# Patient Record
Sex: FEMALE | Race: ASIAN | Employment: UNEMPLOYED | URBAN - METROPOLITAN AREA
[De-identification: names, ages, dates, MRNs, and addresses within clinical notes are randomized per-mention and may not be internally consistent; named-entity substitution may affect disease eponyms.]

---

## 2017-04-17 ENCOUNTER — COMMUNICATION - HEALTHEAST (OUTPATIENT)
Dept: FAMILY MEDICINE | Facility: CLINIC | Age: 22
End: 2017-04-17

## 2017-04-25 ENCOUNTER — PRENATAL OFFICE VISIT - HEALTHEAST (OUTPATIENT)
Dept: FAMILY MEDICINE | Facility: CLINIC | Age: 22
End: 2017-04-25

## 2017-04-25 DIAGNOSIS — N92.6 IRREGULAR MENSTRUAL CYCLE: ICD-10-CM

## 2017-04-25 DIAGNOSIS — Z32.01 POSITIVE PREGNANCY TEST: ICD-10-CM

## 2017-04-25 DIAGNOSIS — Z34.90 NORMAL PREGNANCY: ICD-10-CM

## 2017-04-25 LAB
HBV SURFACE AG SERPL QL IA: NEGATIVE
HIV 1+2 AB+HIV1 P24 AG SERPL QL IA: NEGATIVE

## 2017-04-25 ASSESSMENT — MIFFLIN-ST. JEOR: SCORE: 1369.47

## 2017-04-26 LAB — SYPHILIS RPR SCREEN - HISTORICAL: NORMAL

## 2017-05-03 ENCOUNTER — HOSPITAL ENCOUNTER (OUTPATIENT)
Dept: ULTRASOUND IMAGING | Facility: HOSPITAL | Age: 22
Discharge: HOME | End: 2017-05-03
Attending: PHYSICIAN ASSISTANT

## 2017-05-03 DIAGNOSIS — Z32.01 POSITIVE PREGNANCY TEST: ICD-10-CM

## 2017-05-03 DIAGNOSIS — Z34.90 NORMAL PREGNANCY: ICD-10-CM

## 2017-05-18 ENCOUNTER — PRENATAL OFFICE VISIT - HEALTHEAST (OUTPATIENT)
Dept: FAMILY MEDICINE | Facility: CLINIC | Age: 22
End: 2017-05-18

## 2017-05-18 DIAGNOSIS — Z34.90 SUPERVISION OF NORMAL PREGNANCY: ICD-10-CM

## 2017-05-18 ASSESSMENT — MIFFLIN-ST. JEOR: SCORE: 1351.51

## 2017-07-28 ENCOUNTER — COMMUNICATION - HEALTHEAST (OUTPATIENT)
Dept: TELEHEALTH | Facility: CLINIC | Age: 22
End: 2017-07-28

## 2017-07-28 ENCOUNTER — HOSPITAL ENCOUNTER (OUTPATIENT)
Dept: ULTRASOUND IMAGING | Facility: HOSPITAL | Age: 22
Discharge: HOME | End: 2017-07-28
Attending: FAMILY MEDICINE

## 2017-07-28 ENCOUNTER — PRENATAL OFFICE VISIT - HEALTHEAST (OUTPATIENT)
Dept: FAMILY MEDICINE | Facility: CLINIC | Age: 22
End: 2017-07-28

## 2017-07-28 DIAGNOSIS — Z34.90 SUPERVISION OF NORMAL PREGNANCY: ICD-10-CM

## 2017-07-28 DIAGNOSIS — Z33.1 PREGNANT STATE, INCIDENTAL: ICD-10-CM

## 2017-07-28 DIAGNOSIS — Z34.90 PREGNANCY: ICD-10-CM

## 2017-08-01 ENCOUNTER — COMMUNICATION - HEALTHEAST (OUTPATIENT)
Dept: FAMILY MEDICINE | Facility: CLINIC | Age: 22
End: 2017-08-01

## 2017-08-03 ENCOUNTER — RECORDS - HEALTHEAST (OUTPATIENT)
Dept: ADMINISTRATIVE | Facility: OTHER | Age: 22
End: 2017-08-03

## 2017-08-03 ENCOUNTER — OFFICE VISIT - HEALTHEAST (OUTPATIENT)
Dept: FAMILY MEDICINE | Facility: CLINIC | Age: 22
End: 2017-08-03

## 2017-08-03 DIAGNOSIS — Z34.90 SUPERVISION OF NORMAL PREGNANCY: ICD-10-CM

## 2017-08-03 DIAGNOSIS — O26.879 SHORT CERVICAL LENGTH DURING PREGNANCY: ICD-10-CM

## 2017-08-03 ASSESSMENT — MIFFLIN-ST. JEOR: SCORE: 1355.87

## 2017-11-05 ENCOUNTER — COMMUNICATION - HEALTHEAST (OUTPATIENT)
Dept: FAMILY MEDICINE | Facility: CLINIC | Age: 22
End: 2017-11-05

## 2017-11-09 ENCOUNTER — PRENATAL OFFICE VISIT - HEALTHEAST (OUTPATIENT)
Dept: FAMILY MEDICINE | Facility: CLINIC | Age: 22
End: 2017-11-09

## 2017-11-09 ENCOUNTER — HOSPITAL ENCOUNTER (OUTPATIENT)
Dept: LAB | Age: 22
Setting detail: SPECIMEN
Discharge: HOME | End: 2017-11-09

## 2017-11-09 DIAGNOSIS — O09.30 INSUFFICIENT PRENATAL CARE: ICD-10-CM

## 2017-11-09 DIAGNOSIS — Z34.93 ENCOUNTER FOR SUPERVISION OF NORMAL PREGNANCY IN THIRD TRIMESTER: ICD-10-CM

## 2017-11-09 DIAGNOSIS — Z23 NEED FOR IMMUNIZATION AGAINST INFLUENZA: ICD-10-CM

## 2017-11-09 DIAGNOSIS — O09.90 SUPERVISION OF HIGH-RISK PREGNANCY: ICD-10-CM

## 2017-11-10 ENCOUNTER — COMMUNICATION - HEALTHEAST (OUTPATIENT)
Dept: FAMILY MEDICINE | Facility: CLINIC | Age: 22
End: 2017-11-10

## 2017-11-10 LAB — SYPHILIS RPR SCREEN - HISTORICAL: NORMAL

## 2017-11-17 ENCOUNTER — PRENATAL OFFICE VISIT - HEALTHEAST (OUTPATIENT)
Dept: FAMILY MEDICINE | Facility: CLINIC | Age: 22
End: 2017-11-17

## 2017-11-17 DIAGNOSIS — D64.9 ANEMIA: ICD-10-CM

## 2017-11-17 DIAGNOSIS — Z34.93 ENCOUNTER FOR SUPERVISION OF NORMAL PREGNANCY IN THIRD TRIMESTER, UNSPECIFIED GRAVIDITY: ICD-10-CM

## 2017-11-21 ENCOUNTER — AMBULATORY - HEALTHEAST (OUTPATIENT)
Dept: NURSING | Facility: CLINIC | Age: 22
End: 2017-11-21

## 2017-11-27 ENCOUNTER — COMMUNICATION - HEALTHEAST (OUTPATIENT)
Dept: FAMILY MEDICINE | Facility: CLINIC | Age: 22
End: 2017-11-27

## 2017-11-29 ENCOUNTER — HOME CARE/HOSPICE - HEALTHEAST (OUTPATIENT)
Dept: HOME HEALTH SERVICES | Facility: HOME HEALTH | Age: 22
End: 2017-11-29

## 2017-12-01 ENCOUNTER — HOME CARE/HOSPICE - HEALTHEAST (OUTPATIENT)
Dept: HOME HEALTH SERVICES | Facility: HOME HEALTH | Age: 22
End: 2017-12-01

## 2017-12-04 ENCOUNTER — COMMUNICATION - HEALTHEAST (OUTPATIENT)
Dept: FAMILY MEDICINE | Facility: CLINIC | Age: 22
End: 2017-12-04

## 2018-01-29 ENCOUNTER — COMMUNICATION - HEALTHEAST (OUTPATIENT)
Dept: FAMILY MEDICINE | Facility: CLINIC | Age: 23
End: 2018-01-29

## 2018-02-15 ENCOUNTER — COMMUNICATION - HEALTHEAST (OUTPATIENT)
Dept: HEALTH INFORMATION MANAGEMENT | Facility: CLINIC | Age: 23
End: 2018-02-15

## 2018-06-27 ENCOUNTER — COMMUNICATION - HEALTHEAST (OUTPATIENT)
Dept: FAMILY MEDICINE | Facility: CLINIC | Age: 23
End: 2018-06-27

## 2018-07-17 NOTE — PROGRESS NOTES
"  SUBJECTIVE:   Mandy Gooden is a 23 year old female who presents to clinic today for the following health issues:    New Patient/Transfer of Care  Last medical home: [facility] (last seen in January)    Patient is here today to establish care with a primary care physician but needs a referral to be seen by OB due to being around 5 months pregnant (she thinks). Patient is not actively seeing anyone right now during her pregnancy. Patient determined pregnancy with at home pregnancy test.     New Patient/Transfer of Care    OB hx: .  First pregnancy without complications.  Delivery 2017 via , no post-partum issues.    Problem list and histories reviewed & adjusted, as indicated.  Additional history: as documented    There is no problem list on file for this patient.    No past surgical history on file.    Social History   Substance Use Topics     Smoking status: Never Smoker     Smokeless tobacco: Never Used     Alcohol use No     No family history on file.      Current Outpatient Prescriptions   Medication Sig Dispense Refill     Prenatal Vit-Fe Fumarate-FA (PRENATAL MULTIVITAMIN PLUS IRON) 27-0.8 MG TABS per tablet Take 1 tablet by mouth daily 100 tablet 3     No Known Allergies    Reviewed and updated as needed this visit by clinical staff       Reviewed and updated as needed this visit by Provider         ROS:  Nausea - resolved, mild compared to first pregnancy.  All other systems on a 10-point review are negative, unless otherwise noted in HPI      OBJECTIVE:     BP 98/60 (BP Location: Right arm, Patient Position: Chair)  Pulse 81  Temp 97.7  F (36.5  C) (Oral)  Ht 5' 0.12\" (1.527 m)  Wt 161 lb 12.8 oz (73.4 kg)  LMP 01/15/2018 (Approximate)  SpO2 99%  BMI 31.48 kg/m2  Body mass index is 31.48 kg/(m^2).  GENERAL: healthy, alert and no distress  NECK: no adenopathy, no asymmetry, masses, or scars and thyroid normal to palpation  RESP: lungs clear to auscultation - no rales, rhonchi or " wheezes  CV: regular rate and rhythm, normal S1 S2, no S3 or S4, no murmur, click or rub, no peripheral edema and peripheral pulses strong  ABDOMEN: gravid, soft, nontender, bowel sounds normal  MS: no gross musculoskeletal defects noted, no edema    Diagnostic Test Results:  none     ASSESSMENT/PLAN:       1. Pregnant on abdominal palpation in second trimester  Patient is establishing care today.  I placed a referral to OB/GYN and ordered initial ultrasound and prenatal labs.  - Beta HCG qual IFA urine  - US OB > 14 Weeks Complete Single; Future  - HIV Antigen Antibody Combo  - ABO/Rh type and screen  - CBC with platelets  - Hepatitis B surface antigen  - Urine Culture Aerobic Bacterial  - OB/GYN REFERRAL  - Prenatal Vit-Fe Fumarate-FA (PRENATAL MULTIVITAMIN PLUS IRON) 27-0.8 MG TABS per tablet; Take 1 tablet by mouth daily  Dispense: 100 tablet; Refill: 3    2. Late prenatal care affecting pregnancy in second trimester  Patient recently got insurance coverage.      Patient Instructions   Congratulations!    I have referred you to our OB/GYN department for continuation of care during and after your pregnancy.  Afterwards, I am happy to resume your primary care needs.    They will call you to schedule the ultrasound and initial appointment.      Pregnancy: More Common Questions  On this sheet, you ll find answers to some common questions about pregnancy. If you have other questions, talk with your healthcare provider.    Will traveling be too stressful?  Not if you set the pace. When you plan a trip, allow time to stop and rest. You may even want to postpone travel until the second trimester, when your body is more adjusted to pregnancy. Don t wait as long as the third trimester, because of the possibility of going into early labor. Travel to a location where healthcare facilities are close by. You may want to take a copy of your records with you in case you need medical care when you are traveling.  Can I still be  a vegetarian?  Yes. Be sure to consult a registered dietitian. And be sure to get enough of the following:    Protein. Eat eggs and milk if you re an ovo-lacto vegetarian. Eat vegetable proteins, like tofu and beans, if you re a vegan.    Calcium. If you don t eat dairy, try soy milk, soy cheese fortified with calcium, and orange juice fortified with calcium.    Vitamin B12. You may need to take a supplement that includes folic acid.    Vitamin D. If you don t drink milk, ask about taking a supplement.    Iron. Your healthcare provider may recommend a supplement.  Can I paint my nails?  Yes. Nail polish is not thought to be dangerous during pregnancy. Just be careful about breathing the fumes. Keep windows open or use a fan. However, long-term exposure to the solvents used to remove nail polish may not be safe. If you work in a nail salon, talk with your healthcare provider about safety concerns.  Should I eat more if I exercise?  You will only need an extra 100 calories for each 30 minutes of mild exercise. But you ll also need more fluids. Drink at least an extra 8 ounces of water.  Do I have to stop jogging?  You can jog as long as you are comfortable. Many women find the impact or bounce of a jog doesn t feel good. Some switch to brisk walking as their pregnancy advances.  What should I limit or avoid eating or drinking?    Don't drink unpasteurized milk products or juices.    Don't eat raw or undercooked meat, poultry, fish, or eggs.     Don't eat prepared meats, like hot dogs or deli meat, unless served steaming hot.    Don't drink alcohol.    Limit caffeine unless your healthcare provider tells you otherwise.  Can I lift weights?  If you have been lifting weights, there is no need to stop. Instead, keep the weights light and in control. Never hold your breath. If you haven t been lifting weights, don t start now.  Date Last Reviewed: 10/1/2017    6517-9865 [employer]. [address] [facility],  [place] [address]. All rights reserved. This information is not intended as a substitute for professional medical care. Always follow your healthcare professional's instructions.            Sidney Yen MD  [facility]

## 2018-07-23 ENCOUNTER — TELEPHONE (OUTPATIENT)
Dept: PEDIATRICS | Facility: CLINIC | Age: 23
End: 2018-07-23

## 2018-07-23 ENCOUNTER — OFFICE VISIT (OUTPATIENT)
Dept: PEDIATRICS | Facility: CLINIC | Age: 23
End: 2018-07-23
Payer: COMMERCIAL

## 2018-07-23 VITALS
WEIGHT: 161.8 LBS | SYSTOLIC BLOOD PRESSURE: 98 MMHG | BODY MASS INDEX: 31.77 KG/M2 | DIASTOLIC BLOOD PRESSURE: 60 MMHG | HEART RATE: 81 BPM | OXYGEN SATURATION: 99 % | HEIGHT: 60 IN | TEMPERATURE: 97.7 F

## 2018-07-23 DIAGNOSIS — O09.32 LATE PRENATAL CARE AFFECTING PREGNANCY IN SECOND TRIMESTER: ICD-10-CM

## 2018-07-23 DIAGNOSIS — Z34.92: Primary | ICD-10-CM

## 2018-07-23 LAB
BETA HCG QUAL IFA URINE: POSITIVE
ERYTHROCYTE [DISTWIDTH] IN BLOOD BY AUTOMATED COUNT: 13.1 % (ref 10–15)
HCT VFR BLD AUTO: 35 % (ref 35–47)
HGB BLD-MCNC: 11.6 G/DL (ref 11.7–15.7)
MCH RBC QN AUTO: 27.8 PG (ref 26.5–33)
MCHC RBC AUTO-ENTMCNC: 33.1 G/DL (ref 31.5–36.5)
MCV RBC AUTO: 84 FL (ref 78–100)
PLATELET # BLD AUTO: 213 10E9/L (ref 150–450)
RBC # BLD AUTO: 4.18 10E12/L (ref 3.8–5.2)
WBC # BLD AUTO: 12.4 10E9/L (ref 4–11)

## 2018-07-23 PROCEDURE — 87340 HEPATITIS B SURFACE AG IA: CPT | Performed by: INTERNAL MEDICINE

## 2018-07-23 PROCEDURE — 84703 CHORIONIC GONADOTROPIN ASSAY: CPT | Performed by: INTERNAL MEDICINE

## 2018-07-23 PROCEDURE — 85027 COMPLETE CBC AUTOMATED: CPT | Performed by: INTERNAL MEDICINE

## 2018-07-23 PROCEDURE — 86900 BLOOD TYPING SEROLOGIC ABO: CPT | Performed by: INTERNAL MEDICINE

## 2018-07-23 PROCEDURE — 36415 COLL VENOUS BLD VENIPUNCTURE: CPT | Performed by: INTERNAL MEDICINE

## 2018-07-23 PROCEDURE — 99203 OFFICE O/P NEW LOW 30 MIN: CPT | Performed by: INTERNAL MEDICINE

## 2018-07-23 PROCEDURE — 87086 URINE CULTURE/COLONY COUNT: CPT | Performed by: INTERNAL MEDICINE

## 2018-07-23 PROCEDURE — 87389 HIV-1 AG W/HIV-1&-2 AB AG IA: CPT | Performed by: INTERNAL MEDICINE

## 2018-07-23 PROCEDURE — 86901 BLOOD TYPING SEROLOGIC RH(D): CPT | Performed by: INTERNAL MEDICINE

## 2018-07-23 PROCEDURE — 86850 RBC ANTIBODY SCREEN: CPT | Performed by: INTERNAL MEDICINE

## 2018-07-23 RX ORDER — PRENATAL VIT/IRON FUM/FOLIC AC 27MG-0.8MG
1 TABLET ORAL DAILY
Qty: 100 TABLET | Refills: 3 | Status: SHIPPED | OUTPATIENT
Start: 2018-07-23

## 2018-07-23 NOTE — TELEPHONE ENCOUNTER
Pharmacist is calling  [facility] will not cover a Prenatal vitamin with 0.8 mg of Folic acid because this may be purchased OTC.  Would it be ok to substitute a prenatal vitamin that contains 1 mg of Folic acid?  Discussed with Dr. Yen and verbal approval given for this order.  Pharmacist notified and he will make this substitution.  DON Hart RN

## 2018-07-23 NOTE — MR AVS SNAPSHOT
After Visit Summary   7/23/2018    Mandy Gooden    MRN: [id number]           Patient Information     Date Of Birth          1995        Visit Information        Provider Department      7/23/2018 5:50 PM Po Yen Mai, MD [facility]        Today's Diagnoses     Pregnancy test positive    -  1      Care Instructions    Congratulations!    I have referred you to our OB/GYN department for continuation of care during and after your pregnancy.  Afterwards, I am happy to resume your primary care needs.    They will call you to schedule the ultrasound and initial appointment.      Pregnancy: More Common Questions  On this sheet, you ll find answers to some common questions about pregnancy. If you have other questions, talk with your healthcare provider.    Will traveling be too stressful?  Not if you set the pace. When you plan a trip, allow time to stop and rest. You may even want to postpone travel until the second trimester, when your body is more adjusted to pregnancy. Don t wait as long as the third trimester, because of the possibility of going into early labor. Travel to a location where healthcare facilities are close by. You may want to take a copy of your records with you in case you need medical care when you are traveling.  Can I still be a vegetarian?  Yes. Be sure to consult a registered dietitian. And be sure to get enough of the following:    Protein. Eat eggs and milk if you re an ovo-lacto vegetarian. Eat vegetable proteins, like tofu and beans, if you re a vegan.    Calcium. If you don t eat dairy, try soy milk, soy cheese fortified with calcium, and orange juice fortified with calcium.    Vitamin B12. You may need to take a supplement that includes folic acid.    Vitamin D. If you don t drink milk, ask about taking a supplement.    Iron. Your healthcare provider may recommend a supplement.  Can I paint my nails?  Yes. Nail polish is not thought to be dangerous during  pregnancy. Just be careful about breathing the fumes. Keep windows open or use a fan. However, long-term exposure to the solvents used to remove nail polish may not be safe. If you work in a nail salon, talk with your healthcare provider about safety concerns.  Should I eat more if I exercise?  You will only need an extra 100 calories for each 30 minutes of mild exercise. But you ll also need more fluids. Drink at least an extra 8 ounces of water.  Do I have to stop jogging?  You can jog as long as you are comfortable. Many women find the impact or bounce of a jog doesn t feel good. Some switch to brisk walking as their pregnancy advances.  What should I limit or avoid eating or drinking?    Don't drink unpasteurized milk products or juices.    Don't eat raw or undercooked meat, poultry, fish, or eggs.     Don't eat prepared meats, like hot dogs or deli meat, unless served steaming hot.    Don't drink alcohol.    Limit caffeine unless your healthcare provider tells you otherwise.  Can I lift weights?  If you have been lifting weights, there is no need to stop. Instead, keep the weights light and in control. Never hold your breath. If you haven t been lifting weights, don t start now.  Date Last Reviewed: 10/1/2017    1249-0327 [employer]. [address] [place] [address], [place] [address]. All rights reserved. This information is not intended as a substitute for professional medical care. Always follow your healthcare professional's instructions.                Follow-ups after your visit        Additional Services     OB/GYN REFERRAL       Your provider has referred you to:  [facility]ESS[facility]: [place] [facility] [phone]   [email or web address]    MIDWIFE IS OKAY    Please be aware that coverage of these services is subject to the terms and limitations of your health insurance plan.  Call member services at your health plan with any benefit or coverage questions.      Please bring the  "following with you to your appointment:    (1) Any X-Rays, CTs or MRIs which have been performed.  Contact the facility where they were done to arrange for  prior to your scheduled appointment.   (2) List of current medications   (3) This referral request   (4) Any documents/labs given to you for this referral                  Future tests that were ordered for you today     Open Future Orders        Priority Expected Expires Ordered    US OB > 14 Weeks Complete Single Routine  7/23/2019 7/23/2018            Who to contact     If you have questions or need follow up information about today's clinic visit or your schedule please contact [facility] directly at [phone].  Normal or non-critical lab and imaging results will be communicated to you by MyChart, letter or phone within 4 business days after the clinic has received the results. If you do not hear from us within 7 days, please contact the clinic through [facility] or phone. If you have a critical or abnormal lab result, we will notify you by phone as soon as possible.  Submit refill requests through [facility] or call your pharmacy and they will forward the refill request to us. Please allow 3 business days for your refill to be completed.          Additional Information About Your Visit        [facility] Information     [facility] gives you secure access to your electronic health record. If you see a primary care provider, you can also send messages to your care team and make appointments. If you have questions, please call your primary care clinic.  If you do not have a primary care provider, please call [phone] and they will assist you.        Care EveryWhere ID     This is your Care EveryWhere ID. This could be used by other organizations to access your [place] medical records  [id number]        Your Vitals Were     Pulse Temperature Height Last Period Pulse Oximetry BMI (Body Mass Index)    81 97.7  F (36.5  C) (Oral) 5' 0.12\" (1.527 m) " 01/15/2018 (Approximate) 99% 31.48 kg/m2       Blood Pressure from Last 3 Encounters:   07/23/18 98/60    Weight from Last 3 Encounters:   07/23/18 161 lb 12.8 oz (73.4 kg)              We Performed the Following     ABO/Rh type and screen     Beta HCG qual IFA urine     CBC with platelets     Hepatitis B surface antigen     HIV Antigen Antibody Combo     OB/GYN REFERRAL     Urine Culture Aerobic Bacterial        Primary Care Provider Office Phone # Fax #    [facility] [phone] [address] [facility] [address]        Equal Access to Services     [facility] : Hadii kyle meloo Sofritz, waaxda luqadaha, qaybta kaalmada everardo, shantelle donald . So [facility] [phone].    ATENCIÓN: Si habla español, tiene a mckenzie disposición servicios gratuitos de asistencia lingüística. Llame al [phone].    We comply with applicable federal civil rights laws and [place] laws. We do not discriminate on the basis of race, color, national origin, age, disability, sex, sexual orientation, or gender identity.            Thank you!     Thank you for choosing [facility]  for your care. Our goal is always to provide you with excellent care. Hearing back from our patients is one way we can continue to improve our services. Please take a few minutes to complete the written survey that you may receive in the mail after your visit with us. Thank you!             Your Updated Medication List - Protect others around you: Learn how to safely use, store and throw away your medicines at www.disposemymeds.org.      Notice  As of 7/23/2018  6:18 PM    You have not been prescribed any medications.

## 2018-07-23 NOTE — PATIENT INSTRUCTIONS
Congratulations!    I have referred you to our OB/GYN department for continuation of care during and after your pregnancy.  Afterwards, I am happy to resume your primary care needs.    They will call you to schedule the ultrasound and initial appointment.      Pregnancy: More Common Questions  On this sheet, you ll find answers to some common questions about pregnancy. If you have other questions, talk with your healthcare provider.    Will traveling be too stressful?  Not if you set the pace. When you plan a trip, allow time to stop and rest. You may even want to postpone travel until the second trimester, when your body is more adjusted to pregnancy. Don t wait as long as the third trimester, because of the possibility of going into early labor. Travel to a location where healthcare facilities are close by. You may want to take a copy of your records with you in case you need medical care when you are traveling.  Can I still be a vegetarian?  Yes. Be sure to consult a registered dietitian. And be sure to get enough of the following:    Protein. Eat eggs and milk if you re an ovo-lacto vegetarian. Eat vegetable proteins, like tofu and beans, if you re a vegan.    Calcium. If you don t eat dairy, try soy milk, soy cheese fortified with calcium, and orange juice fortified with calcium.    Vitamin B12. You may need to take a supplement that includes folic acid.    Vitamin D. If you don t drink milk, ask about taking a supplement.    Iron. Your healthcare provider may recommend a supplement.  Can I paint my nails?  Yes. Nail polish is not thought to be dangerous during pregnancy. Just be careful about breathing the fumes. Keep windows open or use a fan. However, long-term exposure to the solvents used to remove nail polish may not be safe. If you work in a nail salon, talk with your healthcare provider about safety concerns.  Should I eat more if I exercise?  You will only need an extra 100 calories for each 30 minutes  of mild exercise. But you ll also need more fluids. Drink at least an extra 8 ounces of water.  Do I have to stop jogging?  You can jog as long as you are comfortable. Many women find the impact or bounce of a jog doesn t feel good. Some switch to brisk walking as their pregnancy advances.  What should I limit or avoid eating or drinking?    Don't drink unpasteurized milk products or juices.    Don't eat raw or undercooked meat, poultry, fish, or eggs.     Don't eat prepared meats, like hot dogs or deli meat, unless served steaming hot.    Don't drink alcohol.    Limit caffeine unless your healthcare provider tells you otherwise.  Can I lift weights?  If you have been lifting weights, there is no need to stop. Instead, keep the weights light and in control. Never hold your breath. If you haven t been lifting weights, don t start now.  Date Last Reviewed: 10/1/2017    4255-6155 The [employer]. [address] [place] [address], [place] [address]. All rights reserved. This information is not intended as a substitute for professional medical care. Always follow your healthcare professional's instructions.

## 2018-07-24 LAB
ABO + RH BLD: NORMAL
ABO + RH BLD: NORMAL
BLD GP AB SCN SERPL QL: NORMAL
BLOOD BANK CMNT PATIENT-IMP: NORMAL
HBV SURFACE AG SERPL QL IA: NONREACTIVE
HIV 1+2 AB+HIV1 P24 AG SERPL QL IA: NONREACTIVE
SPECIMEN EXP DATE BLD: NORMAL

## 2018-07-25 LAB
BACTERIA SPEC CULT: NORMAL
SPECIMEN SOURCE: NORMAL

## 2018-07-30 ENCOUNTER — HEALTH MAINTENANCE LETTER (OUTPATIENT)
Age: 23
End: 2018-07-30

## 2018-07-31 ENCOUNTER — TELEPHONE (OUTPATIENT)
Dept: OBGYN | Facility: CLINIC | Age: 23
End: 2018-07-31

## 2018-07-31 ENCOUNTER — TELEPHONE (OUTPATIENT)
Dept: PEDIATRICS | Facility: CLINIC | Age: 23
End: 2018-07-31

## 2018-07-31 NOTE — TELEPHONE ENCOUNTER
Reached out to patient several times via phone and left messages, sent MyChart message today. Patient needs to schedule a new prenatal appointment with Lara Deutsch who can help her see an OB physician or midwife. Patient found out she is pregnant in her second trimester during establish care appointment with Dr. Yen on 7/23/2018.    Will send letter to patient if not scheduled or responded to messages/MyChart within 1 week.     Candy Bullock MA 1:26 PM 7/31/2018

## 2018-07-31 NOTE — TELEPHONE ENCOUNTER
Pt is requesting to transfer care to [place] from her previous clinic [facility] due to insurance issues.  Pt ahs not had any prenatal care.      2 Para 1  EDC around 10/25/18, GA around 28 weeks, LMP sometime in January    U/S done? none    Previous C-sec? No, last was vaginal delivery    List all major health problems: none    List all complications of past deliveries (GDM, BP, etc):  none    List all current pregnancy issues:  none    List current medication list: none  Pt records: none    Per protocol, message routed to MD on-call for direction.      Kathy MENDOZA R.N.  [facility]

## 2018-08-01 NOTE — TELEPHONE ENCOUNTER
Leaves arrange for the patient to have an ultrasound exam for dating as soon as possible and schedule her for a first OB visit ASAP  Hayder Alvarez MD FACOG

## 2018-08-03 NOTE — TELEPHONE ENCOUNTER
Looks like patient is currently scheduled. Closing encounter.     Candy Bullock MA 1:12 PM 8/3/2018

## 2018-08-08 ENCOUNTER — PRENATAL OFFICE VISIT (OUTPATIENT)
Dept: NURSING | Facility: CLINIC | Age: 23
End: 2018-08-08
Payer: COMMERCIAL

## 2018-08-08 ENCOUNTER — TELEPHONE (OUTPATIENT)
Dept: OBGYN | Facility: CLINIC | Age: 23
End: 2018-08-08

## 2018-08-08 DIAGNOSIS — Z34.80 PRENATAL CARE, SUBSEQUENT PREGNANCY, UNSPECIFIED TRIMESTER: Primary | ICD-10-CM

## 2018-08-08 DIAGNOSIS — O09.93 HIGH-RISK PREGNANCY, THIRD TRIMESTER: ICD-10-CM

## 2018-08-08 PROCEDURE — 86780 TREPONEMA PALLIDUM: CPT | Performed by: ADVANCED PRACTICE MIDWIFE

## 2018-08-08 PROCEDURE — 99207 ZZC NO CHARGE NURSE ONLY: CPT

## 2018-08-08 PROCEDURE — 36415 COLL VENOUS BLD VENIPUNCTURE: CPT | Performed by: ADVANCED PRACTICE MIDWIFE

## 2018-08-08 PROCEDURE — 86762 RUBELLA ANTIBODY: CPT | Performed by: ADVANCED PRACTICE MIDWIFE

## 2018-08-08 NOTE — PROGRESS NOTES
Pt attended a NPN nurse class.  Pt is , 31w2d GA based off of LMP.  THis pt has had no previous OB care for this pregnancy.      Pt's occupation: n/a  Pt c/o: SOB and chest pain, will call pt and triage in separate TE  Advised: n/a  Prev hx of : none  Hx of pregnancy complications: none  Hx of delivery complications: none    No results found for: PAP (unknown per pt)    Hx of abnml pap: no    Went over all information as listed in checklist for 6-12 weeks as well as clinic info and signs/sx to call for.     Kathy MENDOZA R.N.  [facility]

## 2018-08-08 NOTE — TELEPHONE ENCOUNTER
31w2d    Pt writes that she has had chest pain and SOB on her notes for class.  Call to triage.    Kathy MENDOZA R.N.  [facility]

## 2018-08-08 NOTE — TELEPHONE ENCOUNTER
LM for pt to cb.  Wanted to ask about the chest pain and SOB she said she was having on her OB questionairre.     How long?  Type of pain? Etc.      Kathy MENDOZA R.N.  [facility]

## 2018-08-08 NOTE — MR AVS SNAPSHOT
After Visit Summary   8/8/2018    Mandy Gooden    MRN: [id number]           Patient Information     Date Of Birth          1995        Visit Information        Provider Department      8/8/2018 9:00 AM  PRENATAL NURSE [facility]        Today's Diagnoses     Prenatal care, subsequent pregnancy, unspecified trimester    -  1    High-risk pregnancy, third trimester           Follow-ups after your visit        Your next 10 appointments already scheduled     Aug 09, 2018  4:00 PM CDT   New Prenatal with CAROLINE Canas CNM   [facility] ([facility])    [address]  [facility] [id number] [phone]            Aug 09, 2018  5:30 PM CDT   US OB > 14 WEEKS COMPLETE SINGLE with [id number]   [facility] ([facility] [place]    [address]  [facility] [address] [phone]           Please bring a list of your medicines (including vitamins, minerals and over-the-counter drugs). Also, tell your doctor about any allergies you may have. Wear comfortable clothes and leave your valuables at home.  Drink four 8-ounce glasses of fluid an hour before your exam. If you need to empty your bladder before your exam, try to release only a little urine. Then, drink another glass of fluid.  You may have up to two family members in the exam room. If you bring a small child, an adult must be there to care for him or her. No video or camera photography during the procedure.  Please call the Imaging Department at your exam site with any questions.              Who to contact     If you have questions or need follow up information about today's clinic visit or your schedule please contact [facility] directly at [phone].  Normal or non-critical lab and imaging results will be communicated to you by MyChart, letter or phone within 4 business days after the  clinic has received the results. If you do not hear from us within 7 days, please contact the clinic through [employer] or phone. If you have a critical or abnormal lab result, we will notify you by phone as soon as possible.  Submit refill requests through [employer] or call your pharmacy and they will forward the refill request to us. Please allow 3 business days for your refill to be completed.          Additional Information About Your Visit        [employer] Information     [employer] gives you secure access to your electronic health record. If you see a primary care provider, you can also send messages to your care team and make appointments. If you have questions, please call your primary care clinic.  If you do not have a primary care provider, please call [phone] and they will assist you.        Care EveryWhere ID     This is your Care EveryWhere ID. This could be used by other organizations to access your [place] medical records  [id number]        Your Vitals Were     Last Period                   01/01/2018            Blood Pressure from Last 3 Encounters:   07/23/18 98/60    Weight from Last 3 Encounters:   07/23/18 161 lb 12.8 oz (73.4 kg)              We Performed the Following     Rubella Antibody IgG Quantitative     Treponema Abs w Reflex to RPR and Titer        Primary Care Provider Office Phone # Fax #    Po Yen [phone] [address] [place] [address]        Equal Access to Services     [facility]: Hadii aad ku hadasho Soomaali, waaxda luqadaha, qaybta kaalmada adeegyada, waxay casandra donald . So [facility] [phone].    ATENCIÓN: Si habla español, tiene a mckenzie disposición servicios gratuitos de asistencia lingüística. Llame al [phone].    We comply with applicable federal civil rights laws and [place] laws. We do not discriminate on the basis of race, color, national origin, age, disability, sex, sexual orientation, or  gender identity.            Thank you!     Thank you for choosing [facility]  for your care. Our goal is always to provide you with excellent care. Hearing back from our patients is one way we can continue to improve our services. Please take a few minutes to complete the written survey that you may receive in the mail after your visit with us. Thank you!             Your Updated Medication List - Protect others around you: Learn how to safely use, store and throw away your medicines at www.disposemymeds.org.          This list is accurate as of 8/8/18 10:29 AM.  Always use your most recent med list.                   Brand Name Dispense Instructions for use Diagnosis    prenatal multivitamin plus iron 27-0.8 MG Tabs per tablet     100 tablet    Take 1 tablet by mouth daily    Pregnant on abdominal palpation in second trimester

## 2018-08-09 ENCOUNTER — PRENATAL OFFICE VISIT (OUTPATIENT)
Dept: OBGYN | Facility: CLINIC | Age: 23
End: 2018-08-09
Payer: COMMERCIAL

## 2018-08-09 ENCOUNTER — HOSPITAL ENCOUNTER (OUTPATIENT)
Dept: ULTRASOUND IMAGING | Facility: CLINIC | Age: 23
Discharge: HOME | End: 2018-08-09
Attending: INTERNAL MEDICINE | Admitting: INTERNAL MEDICINE
Payer: COMMERCIAL

## 2018-08-09 VITALS
HEART RATE: 76 BPM | WEIGHT: 164.3 LBS | SYSTOLIC BLOOD PRESSURE: 102 MMHG | DIASTOLIC BLOOD PRESSURE: 64 MMHG | BODY MASS INDEX: 31.96 KG/M2

## 2018-08-09 DIAGNOSIS — Z34.92: ICD-10-CM

## 2018-08-09 DIAGNOSIS — Z11.3 ROUTINE SCREENING FOR STI (SEXUALLY TRANSMITTED INFECTION): ICD-10-CM

## 2018-08-09 DIAGNOSIS — O09.30 LATE PRENATAL CARE: ICD-10-CM

## 2018-08-09 DIAGNOSIS — Z34.83 ENCOUNTER FOR SUPERVISION OF OTHER NORMAL PREGNANCY, THIRD TRIMESTER: Primary | ICD-10-CM

## 2018-08-09 DIAGNOSIS — Z23 NEED FOR TDAP VACCINATION: ICD-10-CM

## 2018-08-09 LAB
RUBV IGG SERPL IA-ACNC: 23 IU/ML
T PALLIDUM AB SER QL: NONREACTIVE

## 2018-08-09 PROCEDURE — 36415 COLL VENOUS BLD VENIPUNCTURE: CPT | Performed by: ADVANCED PRACTICE MIDWIFE

## 2018-08-09 PROCEDURE — 87491 CHLMYD TRACH DNA AMP PROBE: CPT | Performed by: ADVANCED PRACTICE MIDWIFE

## 2018-08-09 PROCEDURE — 82950 GLUCOSE TEST: CPT | Performed by: ADVANCED PRACTICE MIDWIFE

## 2018-08-09 PROCEDURE — 80307 DRUG TEST PRSMV CHEM ANLYZR: CPT | Performed by: ADVANCED PRACTICE MIDWIFE

## 2018-08-09 PROCEDURE — 90471 IMMUNIZATION ADMIN: CPT | Performed by: ADVANCED PRACTICE MIDWIFE

## 2018-08-09 PROCEDURE — 99207 ZZC FIRST OB VISIT: CPT | Performed by: ADVANCED PRACTICE MIDWIFE

## 2018-08-09 PROCEDURE — 76805 OB US >/= 14 WKS SNGL FETUS: CPT

## 2018-08-09 PROCEDURE — 87591 N.GONORRHOEAE DNA AMP PROB: CPT | Performed by: ADVANCED PRACTICE MIDWIFE

## 2018-08-09 PROCEDURE — 90715 TDAP VACCINE 7 YRS/> IM: CPT | Performed by: ADVANCED PRACTICE MIDWIFE

## 2018-08-09 NOTE — NURSING NOTE
"Chief Complaint   Patient presents with     Prenatal Care     Has not had prenatal care with this pregnancy due to lack of insurance        Initial /64  Pulse 76  Wt 164 lb 4.8 oz (74.5 kg)  LMP 2018  BMI 31.96 kg/m2 Estimated body mass index is 31.96 kg/(m^2) as calculated from the following:    Height as of 18: 5' 0.12\" (1.527 m).    Weight as of this encounter: 164 lb 4.8 oz (74.5 kg).  BP completed using cuff size: regular        The following HM Due: pap smear - will plan for post partum  Chalmydia (13-) : today  Vaccinations: HPV Will plan  For post partum    Laverne Faulkner CMA               "

## 2018-08-09 NOTE — PROGRESS NOTES
Mandy Gooden is a 23 year old  Hmong,  who is not a previous CNM patient. She presents for a new OB Visit. This was not a planned pregnancy.     FOB is Ulysses who is in good health.  KARTHIK IS actively involved in relationship and this pregnancy. Late to prenatal care, has not been seen this pregnancy. Reports +UPT in Feb, states she had insurance issues keeping her from care. Denies illicit drug use. Has 8 mo old at home, is not currently breastfeeding.       She has not had bleeding since her LMP.    She denies abdominal pain since her LMP.  She has not had nausea.  has not had vomiting.  Any personal or family history of blood clots? No  History of sickle cell anemia or trait? No         Patient's last menstrual period was 2018..  Estimated Date of Delivery: Oct 8, 2018 Ultrasound consistent with LMP.    MENSTRUAL HISTORY    frequency: every 28-30 days  Last PAP:  Has never had   History of abnormal Pap?  No    Health maintenance updated:  yes        Current medications are:    Current Outpatient Prescriptions:      Prenatal Vit-Fe Fumarate-FA (PRENATAL MULTIVITAMIN PLUS IRON) 27-0.8 MG TABS per tablet, Take 1 tablet by mouth daily, Disp: 100 tablet, Rfl: 3     INFECTION HISTORY  HIV: No  Hepatitis B: No  Hepatitis C: No  Tuberculosis: No   Genital Herpes self: no  Herpes partner:  no  Chlamydia:  no  Gonorrhea:  no  HPV: No  BV:  No  Syphilis:  No  Chicken Pox:  No, uncertain vaccine       OB HISTORY  Obstetric History       T0      L1     SAB0   TAB0   Ectopic0   Multiple0   Live Births1       # Outcome Date GA Lbr Inocencio/2nd Weight Sex Delivery Anes PTL Lv   2 Current            1  /   6 lb 5 oz (2.863 kg) M Vag-Spont   PROMISE          History of GDM: No,  PTL : No,  History of HTN in pregnancy: No,  Thrombocytopenia: No,  Shoulder dystocia: No,  Vacuum Extraction: No  PPH: No   3rd of 4th degree laceration: No.   Other complications: No    PERSONAL HISTORY  Exercise Habits:   walking 4-7 days per week.  Employment: Housewife.  Her job involves light activity with no potential for toxic exposure.    Travel plans:  are none planned.   Diet: follows a balanced nutrition diet  Abuse concerns? No  Hgb A1c screen:  BMI > 30: No, 1st degree family DM: No, History of GDM: No, PCOS: No, High risk ethnicity: No    Social History     Social History     Marital status: Single     Spouse name: N/A     Number of children: N/A     Years of education: N/A     Occupational History     Not on file.     Social History Main Topics     Smoking status: Never Smoker     Smokeless tobacco: Never Used     Alcohol use No     Drug use: No     Sexual activity: Yes     Other Topics Concern     Not on file     Social History Narrative       She  reports that she has never smoked. She has never used smokeless tobacco.    STD testing offered?  Accepted  Last PHQ-9 score on record = No flowsheet data found.  Last GAD7 score on record = No flowsheet data found.  Alcohol Score = no  Referral/Meds needed? yes    PAST MEDICAL/SURGICAL HISTORY  History reviewed. No pertinent past medical history.  History reviewed. No pertinent surgical history.    FAMILY HISTORY  History reviewed. No pertinent family history.    ROS:  12 point review of systems negative other than symptoms noted below.      PHYSICAL EXAM  Vitals: /64  Pulse 76  Wt 164 lb 4.8 oz (74.5 kg)  LMP 01/01/2018  BMI 31.96 kg/m2  BMI= Body mass index is 31.96 kg/(m^2).     GENERAL:  23 year old pleasant pregnant female, alert, cooperative and well groomed.  NECK:  Thyroid without enlargement and nodules.  Lymph nodes not palpable.   LUNGS:  Clear to auscultation.  BREAST:  Deferred  HEART:  RRR without murmur.  ABDOMEN: Soft without masses or tenderness.  No scars noted..  Pelvic: deferred   UTERUS: 30 weeks in size.  ADNEXA: Without masses or tenderness  RECTAL:  Normal appearance.  Digital exam deferred.  LOWER EXTREMITIES: No edema. No significant  varicosities.    ASSESSMENT/PLAN:    IUP at 31w3d    ICD-10-CM    1. Encounter for supervision of other normal pregnancy, third trimester Z34.83 Glucose tolerance gest screen 1 hour     TDAP VACCINE (ADACEL)     ADMIN 1st VACCINE   2. Late prenatal care O09.30 Glucose tolerance gest screen 1 hour     TDAP VACCINE (ADACEL)     ADMIN 1st VACCINE   3. Need for Tdap vaccination Z23 TDAP VACCINE (ADACEL)     ADMIN 1st VACCINE      consult for US for AMA patients: NA  Genetic Testing reviewed and discussed, patient desires to decline. Handout provided      COUNSELING    Instructed on use of triage nurse line and contacting the on call after hours in an emergency.     Symptoms of N&V and fatigue usually start to resolve around 12-16 weeks     Reviewed CNM philosophy, call schedule for labor and delivery, and FR for delivery    1st OB handout given outlining appointment spacing and CNM information    Reviewed exercise and nutrition    Recommend to gain 20 pounds with her pregnancy.    Discussed OTC medications. OB med list given    Encouraged patient to arrange  if needed    Encouraged patient to take PNV's/DHA    Travel precautions discussed, no air travel after 36 weeks and Zika Virus discussed    Will call patient with lab results when available    F/U to be addressed next visit:  2 weeks RTC, GCT today, US today    Will return to the clinic in 2 weeks for her next routine prenatal check.  Will call to be seen sooner if problems arise.    (Z34.83) Encounter for supervision of other normal pregnancy, third trimester  (primary encounter diagnosis)  Comment: completing today   Plan: Glucose tolerance gest screen 1 hour, TDAP         VACCINE (ADACEL), ADMIN 1st VACCINE            (O09.30) Late prenatal care  Comment: ordered   Plan: Glucose tolerance gest screen 1 hour, TDAP         VACCINE (ADACEL), ADMIN 1st VACCINE, Drug abuse        scrn 7 UR (/) (RH, SH, UR)            (Z23) Need for  Tdap vaccination  Comment: given  Plan: TDAP VACCINE (ADACEL), ADMIN 1st VACCINE            (Z11.3) Routine screening for STI (sexually transmitted infection)  Comment: collected   Plan: Chlamydia trachomatis PCR, Neisseria         gonorrhoeae PCR        Results pending     CAROLINE Reyes, BENJYM

## 2018-08-09 NOTE — MR AVS SNAPSHOT
After Visit Summary   8/9/2018    Mandy Gooden    MRN: [id number]           Patient Information     Date Of Birth          1995        Visit Information        Provider Department      8/9/2018 4:00 PM Elizabeth Dominguez APRN CNM [facility]        Today's Diagnoses     Encounter for supervision of other normal pregnancy, third trimester    -  1    Late prenatal care        Need for Tdap vaccination        Routine screening for STI (sexually transmitted infection)           Follow-ups after your visit        Follow-up notes from your care team     Return in about 2 weeks (around 8/23/2018) for Prenatal visit.      Who to contact     If you have questions or need follow up information about today's clinic visit or your schedule please contact [facility] directly at [phone].  Normal or non-critical lab and imaging results will be communicated to you by [employer], letter or phone within 4 business days after the clinic has received the results. If you do not hear from us within 7 days, please contact the clinic through [employer] or phone. If you have a critical or abnormal lab result, we will notify you by phone as soon as possible.  Submit refill requests through [employer] or call your pharmacy and they will forward the refill request to us. Please allow 3 business days for your refill to be completed.          Additional Information About Your Visit        MyChart Information     [employer] gives you secure access to your electronic health record. If you see a primary care provider, you can also send messages to your care team and make appointments. If you have questions, please call your primary care clinic.  If you do not have a primary care provider, please call [phone] and they will assist you.        Care EveryWhere ID     This is your Care EveryWhere ID. This could be used by other organizations to access your [place] medical records  [id number]        Your  Vitals Were     Pulse Last Period BMI (Body Mass Index)             76 2018 31.96 kg/m2          Blood Pressure from Last 3 Encounters:   18 102/64   18 98/60    Weight from Last 3 Encounters:   18 164 lb 4.8 oz (74.5 kg)   18 161 lb 12.8 oz (73.4 kg)              We Performed the Following     ADMIN 1st VACCINE     Chlamydia trachomatis PCR     Drug abuse scrn 7 UR (/) (RH, SH, UR)     Glucose tolerance gest screen 1 hour     Neisseria gonorrhoeae PCR     TDAP VACCINE (ADACEL)        Primary Care Provider Office Phone # Fax #    Po Yen [phone] [address] [place] [address]        Equal Access to Services     [facility] : Hadii aad ku hadasho Sof[employer], waaxda luqadaha, qaybta kaalmada adeegyada, waxay sherwinin haycole donald . So [facility] [phone].    ATENCIÓN: Si habla español, tiene a mckenzie disposición servicios gratuitos de asistencia lingüística. Llame al [phone].    We comply with applicable federal civil rights laws and [place] laws. We do not discriminate on the basis of race, color, national origin, age, disability, sex, sexual orientation, or gender identity.            Thank you!     Thank you for choosing [facility]  for your care. Our goal is always to provide you with excellent care. Hearing back from our patients is one way we can continue to improve our services. Please take a few minutes to complete the written survey that you may receive in the mail after your visit with us. Thank you!             Your Updated Medication List - Protect others around you: Learn how to safely use, store and throw away your medicines at www.disposemymeds.org.          This list is accurate as of 18 11:59 PM.  Always use your most recent med list.                   Brand Name Dispense Instructions for use Diagnosis    prenatal multivitamin plus iron 27-0.8 MG Tabs per tablet     100  tablet    Take 1 tablet by mouth daily    Pregnant on abdominal palpation in second trimester

## 2018-08-10 ENCOUNTER — TELEPHONE (OUTPATIENT)
Dept: OBGYN | Facility: CLINIC | Age: 23
End: 2018-08-10

## 2018-08-10 DIAGNOSIS — Z34.83 ENCOUNTER FOR SUPERVISION OF OTHER NORMAL PREGNANCY, THIRD TRIMESTER: Primary | ICD-10-CM

## 2018-08-10 PROBLEM — H54.40 PROFOUND IMPAIRMENT, ONE EYE, IMPAIRMENT LEVEL NOT FURTHER SPECIFIED: Status: ACTIVE | Noted: 2018-08-10

## 2018-08-10 PROBLEM — Z34.90 NORMAL PREGNANCY: Status: ACTIVE | Noted: 2018-08-09

## 2018-08-10 PROBLEM — E55.9 VITAMIN D DEFICIENCY: Status: ACTIVE | Noted: 2018-08-10

## 2018-08-10 LAB — GLUCOSE 1H P 50 G GLC PO SERPL-MCNC: 105 MG/DL (ref 60–129)

## 2018-08-10 NOTE — TELEPHONE ENCOUNTER
Called, LM to call back.   Incomplete cardiac views on ultrasound. MFM referral placed. Dating discrepancy, pending MFM recommendation JOSE M likely to change. CAROLINE Reyes, CNM

## 2018-08-11 LAB
AMPHETAMINES UR QL SCN: NEGATIVE
CANNABINOIDS UR QL: NEGATIVE
COCAINE UR QL: NEGATIVE
OPIATES UR QL SCN: NEGATIVE
PCP UR QL SCN: NEGATIVE

## 2018-08-12 LAB
C TRACH DNA SPEC QL NAA+PROBE: NEGATIVE
N GONORRHOEA DNA SPEC QL NAA+PROBE: NEGATIVE
SPECIMEN SOURCE: NORMAL
SPECIMEN SOURCE: NORMAL

## 2018-08-15 NOTE — TELEPHONE ENCOUNTER
JOYCELYN for pt to cb, notified her that we referred her for another U/s.    Kathy MENDOZA R.N.  [facility]

## 2018-08-20 ENCOUNTER — MYC MEDICAL ADVICE (OUTPATIENT)
Dept: OBGYN | Facility: CLINIC | Age: 23
End: 2018-08-20

## 2018-08-20 NOTE — TELEPHONE ENCOUNTER
My chart message sent to pt asking her to call MFM and the clinic to schedule future appts.  Codi Deutsch RN

## 2018-08-22 NOTE — TELEPHONE ENCOUNTER
In office next on Friday. Will write letter for  and sign for patient to . CAROLINE Reyes, MARIELLA

## 2018-08-22 NOTE — TELEPHONE ENCOUNTER
Elizabeth,    Are we able to write the patient's  a work note excusing him to bring her to appointments?        Pallavi Lee RN

## 2018-08-24 ENCOUNTER — PATIENT OUTREACH (OUTPATIENT)
Dept: CARE COORDINATION | Facility: CLINIC | Age: 23
End: 2018-08-24

## 2018-08-24 DIAGNOSIS — O09.73 SUPERVISION OF HIGH RISK PREGNANCY DUE TO SOCIAL PROBLEMS IN THIRD TRIMESTER: Primary | ICD-10-CM

## 2018-08-24 DIAGNOSIS — O09.33 LATE PRENATAL CARE AFFECTING PREGNANCY IN THIRD TRIMESTER: ICD-10-CM

## 2018-08-24 NOTE — PROGRESS NOTES
Clinic Care Coordination Contact  [facility]/Voicemail    RN Clinic Care Coordinator received and reviewed referral from patient's prenatal provider, Elizabeth Dominguez    Reason for Referral: Resources for Transportation  Additional pertinent details:  Transportation barrier for prenatal care     Referral Source: Care Team  Clinical Data: Care Coordinator Outreach  Outreach attempted x 1.  Left message on voicemail with call back information and requested return call.  Plan: Care Coordinator mailed out care coordination introduction letter on 08/24/18 and will include instructions for contacting [facility] regarding medical transportation. Care Coordinator will update [facility] colleague, Agueda Luevano, with request to try to reach patient again in 1-2 business days.    Ursula Licea, RN  Clinic Care Coordinator  [phone]

## 2018-08-24 NOTE — PROGRESS NOTES
Clinic Care Coordination Contact  Care Team Conversations    Patient returned RN Clinic Care Coordinator's call; she indicated she had rescheduled her OB Ultrasound for 9/4/18 and made a prenatal visit with an alternative provider for 9/7/18; she then also has an appointment with her regular provider on 9/17/18.   She reports she has lined up reliable transportation to all of the above 3 visits.     RN CCC explained that for future rides for medical appointments, the patient could contact [facility] service line and arrange for medical transportation; RN CCC offered to assist in contacting them or to have patient call herself and then call this writer back if she experienced any barriers; the patient stated she would do this on her own.     She reports she has 1 other child at home and feels she has everything else she needs; she is established with the WIC program and has no outstanding questions at this time.     PLAN:  At this time, the patient denies any outstanding need for resources or assistance navigating follow up care. No further care coordination outreaches will be scheduled unless patient initiates or new referral for care coordination is placed.       Ursula Licea RN  Clinic Care Coordinator  [phone]

## 2018-08-24 NOTE — LETTER
[facility] CARE COORDINATION  August 24, 2018    Mandy Gooden  [address]   [place] [address]      Dear Mandy,    I am a clinic care coordinator who works with Sidney Yen MD. I recently tried to call and was unable to reach you. I wanted to introduce myself and provide you with my contact information so that you can call me with questions or concerns about your health care. Below is a description of clinic care coordination and how I can further assist you.     The clinic care coordinator is a registered nurse and/or  who understand the health care system. The goal of clinic care coordination is to help you manage your health and improve access to the [facility] system in the most efficient manner. The registered nurse can assist you in meeting your health care goals by providing education, coordinating services, and strengthening the communication among your providers. The  can assist you with financial, behavioral, psychosocial, chemical dependency, counseling, and/or psychiatric resources.    Please feel free to contact the clinic and ask to speak with a Care Coordinator, with any questions or concerns. We at [facility] are focused on providing you with the highest-quality healthcare experience possible and that all starts with you.     Please note-you may consider calling your [facility] Medical Assistance Customer Service line for assistance in establishing help with medical transportation.     Sincerely,     Ursula Licea

## 2018-08-28 ENCOUNTER — PRE VISIT (OUTPATIENT)
Dept: MATERNAL FETAL MEDICINE | Facility: CLINIC | Age: 23
End: 2018-08-28

## 2018-09-04 ENCOUNTER — HOSPITAL ENCOUNTER (OUTPATIENT)
Dept: ULTRASOUND IMAGING | Facility: CLINIC | Age: 23
Discharge: HOME | End: 2018-09-04
Attending: ADVANCED PRACTICE MIDWIFE | Admitting: ADVANCED PRACTICE MIDWIFE
Payer: COMMERCIAL

## 2018-09-04 ENCOUNTER — OFFICE VISIT (OUTPATIENT)
Dept: MATERNAL FETAL MEDICINE | Facility: CLINIC | Age: 23
End: 2018-09-04
Attending: ADVANCED PRACTICE MIDWIFE
Payer: COMMERCIAL

## 2018-09-04 DIAGNOSIS — O26.90 PREGNANCY RELATED CONDITION, UNSPECIFIED TRIMESTER: ICD-10-CM

## 2018-09-04 DIAGNOSIS — O09.33 LATE PRENATAL CARE AFFECTING PREGNANCY IN THIRD TRIMESTER: Primary | ICD-10-CM

## 2018-09-04 PROCEDURE — 76811 OB US DETAILED SNGL FETUS: CPT

## 2018-09-04 NOTE — MR AVS SNAPSHOT
After Visit Summary   9/4/2018    Mandy Gooden    MRN: [id number]           Patient Information     Date Of Birth          1995        Visit Information        Provider Department      9/4/2018 2:45 PM Rafy Warner MD [facility] Maternal Fetal Medicine [facility] [place]        Today's Diagnoses     Late prenatal care affecting pregnancy in third trimester    -  1       Follow-ups after your visit        Your next 10 appointments already scheduled     Sep 07, 2018  3:15 PM CDT   [facility]uanjose OB-GYN Established Prenatal with Lani Guzman CNM   [facility]    [address]  [address]  [facility] [address]   [phone]            Sep 17, 2018  5:00 PM CDT   Juanjose OB-GYN Established Prenatal with CAROLINE Canas [facility] ([facility])    [address]  [address]  [facility] [address]   [phone]            Sep 24, 2018  5:15 PM CDT   Juanjose OB-GYN Established Prenatal with CAROLINE Canas [facility] ([facility])    [address]  [address]  [place] [address]   [phone]            Sep 25, 2018  2:15 PM CDT   VANESSA US COMPRE SINGLE F/U with [id number]   [facility] Maternal Fetal Medicine Ultrasound - [facility])    [address]  [facility] [id number] [phone]           Wear comfortable clothes and leave your valuables at home.            Sep 25, 2018  2:45 PM CDT   Radiology MD with  VANESSA TRIVEDI   [facility] Maternal Fetal Medicine [facility] [place] ([facility])    [address]  [facility] [address] [phone]           Please arrive at the time given for your first appointment. This visit is used internally to schedule the physician's time during your ultrasound.            Oct 09, 2018  4:30 PM CDT   Juanjose OB-GYN Established Prenatal with Elizabeth Colon  CAROLINE Dominguez CNM   [facility] [place] ([facility])    [address]  [address]  [place] [address] [phone]              Future tests that were ordered for you today     Open Future Orders        Priority Expected Expires Ordered    [facility] Comprehensive Single F/U Routine 9/25/2018 9/4/2019 9/4/2018            Who to contact     If you have questions or need follow up information about today's clinic visit or your schedule please contact [facility] directly at [phone].  Normal or non-critical lab and imaging results will be communicated to you by [employer], letter or phone within 4 business days after the clinic has received the results. If you do not hear from us within 7 days, please contact the clinic through [employer] or phone. If you have a critical or abnormal lab result, we will notify you by phone as soon as possible.  Submit refill requests through [employer] or call your pharmacy and they will forward the refill request to us. Please allow 3 business days for your refill to be completed.          Additional Information About Your Visit        MyChart Information     [employer] gives you secure access to your electronic health record. If you see a primary care provider, you can also send messages to your care team and make appointments. If you have questions, please call your primary care clinic.  If you do not have a primary care provider, please call [phone] and they will assist you.        Care EveryWhere ID     This is your Care EveryWhere ID. This could be used by other organizations to access your [place] medical records  [id number]        Your Vitals Were     Last Period                   01/01/2018            Blood Pressure from Last 3 Encounters:   08/09/18 102/64   07/23/18 98/60    Weight from Last 3 Encounters:   08/09/18 74.5 kg (164 lb 4.8 oz)   07/23/18 73.4 kg (161 lb 12.8 oz)               Primary Care Provider Office  Phone # Fax #    Po Yen [phone] [address] [place] [address]        Equal Access to Services     LETTY DAVIS : Hadii aad ku hadlaureenandre Sofritz, waemanuelda jonathanqadaha, qaaleshiata kajustynda sherleysylvia, shantelle sherwinin hayaalizeth lepebill hernandez lanayelizeth singh. So [facility] [phone].    ATENCIÓN: Si habla español, tiene a mckenzie disposición servicios gratuitos de asistencia lingüística. Llame al [phone].    We comply with applicable federal civil rights laws and [place] laws. We do not discriminate on the basis of race, color, national origin, age, disability, sex, sexual orientation, or gender identity.            Thank you!     Thank you for choosing [facility]  for your care. Our goal is always to provide you with excellent care. Hearing back from our patients is one way we can continue to improve our services. Please take a few minutes to complete the written survey that you may receive in the mail after your visit with us. Thank you!             Your Updated Medication List - Protect others around you: Learn how to safely use, store and throw away your medicines at www.disposemymeds.org.          This list is accurate as of 9/4/18  5:11 PM.  Always use your most recent med list.                   Brand Name Dispense Instructions for use Diagnosis    prenatal multivitamin plus iron 27-0.8 MG Tabs per tablet     100 tablet    Take 1 tablet by mouth daily    Pregnant on abdominal palpation in second trimester

## 2018-09-04 NOTE — PROGRESS NOTES
"Please see \"Imaging\" tab under \"Chart Review\" for details of today's visit.    Rafy Warner      "

## 2018-09-17 ENCOUNTER — PRENATAL OFFICE VISIT (OUTPATIENT)
Dept: OBGYN | Facility: CLINIC | Age: 23
End: 2018-09-17
Payer: COMMERCIAL

## 2018-09-17 VITALS — WEIGHT: 168.9 LBS | DIASTOLIC BLOOD PRESSURE: 56 MMHG | SYSTOLIC BLOOD PRESSURE: 104 MMHG | BODY MASS INDEX: 32.86 KG/M2

## 2018-09-17 DIAGNOSIS — O09.30 LATE PRENATAL CARE: ICD-10-CM

## 2018-09-17 DIAGNOSIS — Z34.93 NORMAL PREGNANCY IN THIRD TRIMESTER: Primary | ICD-10-CM

## 2018-09-17 PROCEDURE — 59025 FETAL NON-STRESS TEST: CPT | Performed by: ADVANCED PRACTICE MIDWIFE

## 2018-09-17 PROCEDURE — 99207 ZZC PRENATAL VISIT: CPT | Performed by: ADVANCED PRACTICE MIDWIFE

## 2018-09-17 NOTE — PROGRESS NOTES
S:  Baby active.  Denies uterine cramping, vaginal bleeding or leaking of fluid. Having increased white vaginal discharge.   O: Vitals: LMP 01/01/2018  BMI= There is no height or weight on file to calculate BMI.  Exam:  Constitutional: healthy, alert and no distress  Respiratory: respirations even and unlabored  Gastrointestinal: Abdomen soft, non-tender. Fundus measures appropriate for gestational age. Fetal heart tones hear without difficulty and within normal limits  : Deferred  Psychiatric: mentation appears normal and affect normal/bright  A: (Z34.93) Normal pregnancy in third trimester  (primary encounter diagnosis)  Comment: reactive NST   Plan: FETAL NON-STRESS TEST        Baseline 140, pos accelerations no decelerations reactive for gestational age     (O09.30) Late prenatal care  Comment: reviewed importance of appts, patient hasn't been seen since 8/9/2018. Patient has scheduled out rest of appointments, stressed patient needs to present to care for rest of pregnancy. Verbalized understanding.       P: Discussed plans for labor. Plans un medicated labor.   Encouraged patient to call with any questions or concerns.  Reviewed [facility] US and dating change. Noted BRADY 7.2 on US 9/4. Patient has repeat US Tuesday at [facility] for fetal growth.  Reactive NST today.   Return to clinic 2 weeks    CAROLINE Reyes, MARIELLA

## 2018-09-17 NOTE — MR AVS SNAPSHOT
After Visit Summary   9/17/2018    Mandy Gooden    MRN: [id number]           Patient Information     Date Of Birth          1995        Visit Information        Provider Department      9/17/2018 5:00 PM [facility]        Today's Diagnoses     Normal pregnancy in third trimester    -  1    Late prenatal care           Follow-ups after your visit        Your next 10 appointments already scheduled     Sep 24, 2018  5:15 PM CDT   [facility] Established Prenatal with CAROLINE Canas RADHA   [facility]    [address] [place] [address]  [address]  [place] [address]   [phone]            Sep 25, 2018  2:15 PM CDT   MFM US COMPRE SINGLE F/U with MFMUSR1   [facility] Maternal Fetal Medicine [facility] - [facility])    [address]  [facility] [address] [phone]           Wear comfortable clothes and leave your valuables at home.            Sep 25, 2018  2:45 PM CDT   [facility]   [facility] Maternal Fetal Medicine [facility])    [address]  [facility] [address] [phone]           Please arrive at the time given for your first appointment. This visit is used internally to schedule the physician's time during your ultrasound.            Oct 09, 2018  4:30 PM CDT   [place] [facility] Established Prenatal with CAROLINE Canas CNM   [facility] [place] ([facility] [place])    [address] [place] [address]  [address]  [place] [address]   [phone]            Oct 15, 2018  6:15 PM CDT   [facility] Established Prenatal with CAROLINE Canas CNM   [facility] [place] ([facility])    [address] [place] [address]  [address]  [place] [address]   [phone]            Oct 22, 2018  6:15 PM CDT   [facility] Established Prenatal with CAROLINE Canas  [facility] [place] ([facility])    [address]  [address]  [place] [address] [phone]            Oct 29, 2018  6:15 PM CDT   [facility]uanjose OB-GYN Established Prenatal with CAROLINE Canas [facility] [place] ([facility])    [address]  [address]  [place] [address] [phone]              Who to contact     If you have questions or need follow up information about today's clinic visit or your schedule please contact [facility] directly at [phone].  Normal or non-critical lab and imaging results will be communicated to you by MyChart, letter or phone within 4 business days after the clinic has received the results. If you do not hear from us within 7 days, please contact the clinic through [employer] or phone. If you have a critical or abnormal lab result, we will notify you by phone as soon as possible.  Submit refill requests through [employer] or call your pharmacy and they will forward the refill request to us. Please allow 3 business days for your refill to be completed.          Additional Information About Your Visit        MyChart Information     [employer] gives you secure access to your electronic health record. If you see a primary care provider, you can also send messages to your care team and make appointments. If you have questions, please call your primary care clinic.  If you do not have a primary care provider, please call [phone] and they will assist you.        Care EveryWhere ID     This is your Care EveryWhere ID. This could be used by other organizations to access your [place] medical records  [id number]        Your Vitals Were     Last Period BMI (Body Mass Index)                01/01/2018 32.86 kg/m2           Blood Pressure from Last 3 Encounters:   09/17/18 104/56   08/09/18 102/64   07/23/18 98/60    Weight from Last 3 Encounters:   09/17/18 168 lb 14.4 oz (76.6 kg)   08/09/18  164 lb 4.8 oz (74.5 kg)   07/23/18 161 lb 12.8 oz (73.4 kg)              We Performed the Following     FETAL NON-STRESS TEST        Primary Care Provider Office Phone # Fax #    Po Yen [phone] [address] [place] [address]        Equal Access to Services     [facility]: Hadii aad ku hadasho Soomaali, waaxda luqadaha, qaybta kaalmada adeegyada, waxay idiin hayaan adeeg khnaylaalexandra laEsperanzaaan . So [facility] [phone].    ATENCIÓN: Si habla español, tiene a mckenzie disposición servicios gratuitos de asistencia lingüística. Llame al [phone].    We comply with applicable federal civil rights laws and [place] laws. We do not discriminate on the basis of race, color, national origin, age, disability, sex, sexual orientation, or gender identity.            Thank you!     [facility] choosing [facility]  [facility] your care. Our goal is always to provide you with excellent care. Hearing back from our patients is one way we can continue to improve our services. Please take a few minutes to complete the written survey that you may receive in the mail after your visit with us. Thank you!             Your Updated Medication List - Protect others around you: Learn how to safely use, store and throw away your medicines at www.disposemymeds.org.          This list is accurate as of 9/17/18  6:07 PM.  Always use your most recent med list.                   Brand Name Dispense Instructions for use Diagnosis    prenatal multivitamin plus iron 27-0.8 MG Tabs per tablet     100 tablet    Take 1 tablet by mouth daily    Pregnant on abdominal palpation in second trimester

## 2018-09-24 ENCOUNTER — PRENATAL OFFICE VISIT (OUTPATIENT)
Dept: OBGYN | Facility: CLINIC | Age: 23
End: 2018-09-24
Payer: COMMERCIAL

## 2018-09-24 VITALS — SYSTOLIC BLOOD PRESSURE: 104 MMHG | DIASTOLIC BLOOD PRESSURE: 62 MMHG | BODY MASS INDEX: 33.07 KG/M2 | WEIGHT: 170 LBS

## 2018-09-24 DIAGNOSIS — O09.30 LATE PRENATAL CARE: ICD-10-CM

## 2018-09-24 DIAGNOSIS — Z23 NEED FOR PROPHYLACTIC VACCINATION AND INOCULATION AGAINST INFLUENZA: ICD-10-CM

## 2018-09-24 DIAGNOSIS — Z34.83 ENCOUNTER FOR SUPERVISION OF OTHER NORMAL PREGNANCY, THIRD TRIMESTER: Primary | ICD-10-CM

## 2018-09-24 PROCEDURE — 99207 ZZC PRENATAL VISIT: CPT | Performed by: ADVANCED PRACTICE MIDWIFE

## 2018-09-24 PROCEDURE — 90471 IMMUNIZATION ADMIN: CPT | Performed by: ADVANCED PRACTICE MIDWIFE

## 2018-09-24 PROCEDURE — 90686 IIV4 VACC NO PRSV 0.5 ML IM: CPT | Performed by: ADVANCED PRACTICE MIDWIFE

## 2018-09-24 NOTE — PROGRESS NOTES
S:  Baby active.  Denies uterine cramping, vaginal bleeding or leaking of fluid.  O: Vitals: /62 (BP Location: Right arm, Patient Position: Sitting, Cuff Size: Adult Regular)  Wt 170 lb (77.1 kg)  LMP 01/01/2018  Breastfeeding? No  BMI 33.07 kg/m2  BMI= Body mass index is 33.07 kg/(m^2).  Exam:  Constitutional: healthy, alert and no distress  Respiratory: respirations even and unlabored  Gastrointestinal: Abdomen soft, non-tender. Fundus measures appropriate for gestational age. Fetal heart tones 150 hear without difficulty and within normal limits.  Cephalic per leopold's maneuver.   : Deferred  Psychiatric: mentation appears normal and affect normal/bright  A: (Z34.83) Encounter for supervision of other normal pregnancy, third trimester  (primary encounter diagnosis)  Plan: return to clinic 2 weeks for routine prenatal care     (Z23) Need for prophylactic vaccination and inoculation against influenza  Comment: ordered   Plan: FLU VACCINE, SPLIT VIRUS, IM (QUADRIVALENT)         [98344]- >3 YRS, Vaccine Administration,         Initial [94070]            (O09.30) Late prenatal care  Comment:   -Patient has follow up with M for growth and BRADY tomorrow. Reviewed with patient pregnancy is not low risk due to late to care, dating and BRADY last visit.  Pending MFM ultrasound and due to risk of pregnancy, reviewed patient will need to see OBGYN for rest of pregnancy.  Patient and  verbalized understanding. Patient is concerned d/t she can only make prenatal appointments for evening hours.    Plan: MFM tomorrow      P: Discussed GBS screen for next visit. Discussed plans for labor. Plans unmedicated birth. Reviewed where to present.    Patient has car seat  Encouraged patient to call with any questions or concerns.  Return to clinic 2 weeks    CAROLINE Reyes, MARIELLA

## 2018-09-24 NOTE — MR AVS SNAPSHOT
After Visit Summary   9/24/2018    Mandy Gooden    MRN: [id number]           Patient Information     Date Of Birth          1995        Visit Information        Provider Department      9/24/2018 5:15 PM Elizabeth Dominguez APRN CNM [facility] [place]        Today's Diagnoses     Encounter for supervision of other normal pregnancy, third trimester    -  1    Need for prophylactic vaccination and inoculation against influenza        Late prenatal care           Follow-ups after your visit        Follow-up notes from your care team     Return in about 2 weeks (around 10/8/2018) for Prenatal visit.      Your next 10 appointments already scheduled     Sep 25, 2018  2:15 PM CDT   MFM US COMPRE SINGLE [facility]   [facility] Maternal Fetal Medicine [facility])    [address]  [facility] [address] [phone]           Wear comfortable clothes and leave your valuables at home.            Sep 25, 2018  2:45 PM CDT   Radiology MD with [facility] MD   [facility] Maternal Fetal Medicine [facility])    [address]  [facility] [address] [phone]           Please arrive at the time given for your first appointment. This visit is used internally to schedule the physician's time during your ultrasound.            Oct 09, 2018  4:30 PM CDT   [facility] OB-GYN Established Prenatal with CAROLINE Canas CNM   [facility] [place] [facility]    [address] [place] [address]  [address]  [place] [address] [phone]            Oct 15, 2018  6:15 PM CDT   Juanjose OB-GYN Established Prenatal with CAROLINE Canas CNM   [facility] [place] [facility])    [address] [place] [address]  [address]  [facility] [address] [phone]            Oct 22, 2018  6:15 PM CDT   Juanjose OB-GYN Established Prenatal with CAROLINE Canas RADHA   [facility]  [place] [facility])    [address]  [address]  [place] [address] [phone]            Oct 29, 2018  6:15 PM CDT   Juanjose OB-GYN Established Prenatal with CAROLINE Canas CNM   [facility] [place] ([facility])    [address]  [address]  [place] [address] [phone]              Who to contact     If you have questions or need follow up information about today's clinic visit or your schedule please contact [facility] directly at [phone].  Normal or non-critical lab and imaging results will be communicated to you by MyChart, letter or phone within 4 business days after the clinic has received the results. If you do not hear from us within 7 days, please contact the clinic through [employer] or phone. If you have a critical or abnormal lab result, we will notify you by phone as soon as possible.  Submit refill requests through [employer] or call your pharmacy and they will forward the refill request to us. Please allow 3 business days for your refill to be completed.          Additional Information About Your Visit        MyChart Information     [employer] gives you secure access to your electronic health record. If you see a primary care provider, you can also send messages to your care team and make appointments. If you have questions, please call your primary care clinic.  If you do not have a primary care provider, please call [phone] and they will assist you.        Care EveryWhere ID     This is your Care EveryWhere ID. This could be used by other organizations to access your [place] medical records  [id number]        Your Vitals Were     Last Period Breastfeeding? BMI (Body Mass Index)             01/01/2018 No 33.07 kg/m2          Blood Pressure from Last 3 Encounters:   09/24/18 104/62   09/17/18 104/56   08/09/18 102/64    Weight from Last 3 Encounters:   09/24/18 170 lb (77.1 kg)   09/17/18 168 lb 14.4 oz  (76.6 kg)   08/09/18 164 lb 4.8 oz (74.5 kg)              We Performed the Following     FLU VACCINE, SPLIT VIRUS, IM (QUADRIVALENT) [18297]- >3 YRS     Vaccine Administration, Initial [19179]        Primary Care Provider Office Phone # Fax #    Po Yen [phone] [address] [place] [address]        Equal Access to Services     : Hadii aad ku hadasho Soomaali, waaxda luqadaha, qaybta kaalmada adeegyada, waxay idiin hayaan adeeg kharash lalogan . So [facility] [phone].    ATENCIÓN: Si habla español, tiene a mckenzie disposición servicios gratuitos de asistencia lingüística. Llame al [phone].    We comply with applicable federal civil rights laws and [place] laws. We do not discriminate on the basis of race, color, national origin, age, disability, sex, sexual orientation, or gender identity.            Thank you!     [facility] for choosing [facility] your care. Our goal is always to provide you with excellent care. Hearing back from our patients is one way we can continue to improve our services. Please take a few minutes to complete the written survey that you may receive in the mail after your visit with us. Thank you!             Your Updated Medication List - Protect others around you: Learn how to safely use, store and throw away your medicines at www.disposemymeds.org.          This list is accurate as of 9/24/18 11:59 PM.  Always use your most recent med list.                   Brand Name Dispense Instructions for use Diagnosis    prenatal multivitamin plus iron 27-0.8 MG Tabs per tablet     100 tablet    Take 1 tablet by mouth daily    Pregnant on abdominal palpation in second trimester

## 2018-09-24 NOTE — NURSING NOTE
"Chief Complaint   Patient presents with     Prenatal Care     34 weeks 5 days- no concerns        Initial /62 (BP Location: Right arm, Patient Position: Sitting, Cuff Size: Adult Regular)  Wt 170 lb (77.1 kg)  LMP 2018  Breastfeeding? No  BMI 33.07 kg/m2 Estimated body mass index is 33.07 kg/(m^2) as calculated from the following:    Height as of 18: 5' 0.12\" (1.527 m).    Weight as of this encounter: 170 lb (77.1 kg).  BP completed using cuff size: regular        The following HM Due: NONE    34w5d  Ruiz Smith, MARIA                "

## 2018-09-24 NOTE — PROGRESS NOTES

## 2018-09-25 ENCOUNTER — OFFICE VISIT (OUTPATIENT)
Dept: MATERNAL FETAL MEDICINE | Facility: CLINIC | Age: 23
End: 2018-09-25
Attending: OBSTETRICS & GYNECOLOGY
Payer: COMMERCIAL

## 2018-09-25 ENCOUNTER — HOSPITAL ENCOUNTER (OUTPATIENT)
Dept: ULTRASOUND IMAGING | Facility: CLINIC | Age: 23
Discharge: HOME | End: 2018-09-25
Attending: OBSTETRICS & GYNECOLOGY | Admitting: OBSTETRICS & GYNECOLOGY
Payer: COMMERCIAL

## 2018-09-25 DIAGNOSIS — O09.33 LATE PRENATAL CARE AFFECTING PREGNANCY IN THIRD TRIMESTER: ICD-10-CM

## 2018-09-25 DIAGNOSIS — O28.8 AFI (AMNIOTIC FLUID INDEX) BORDERLINE LOW: ICD-10-CM

## 2018-09-25 DIAGNOSIS — O09.33 LATE PRENATAL CARE AFFECTING PREGNANCY IN THIRD TRIMESTER: Primary | ICD-10-CM

## 2018-09-25 PROCEDURE — 76816 OB US FOLLOW-UP PER FETUS: CPT

## 2018-09-25 NOTE — MR AVS SNAPSHOT
After Visit Summary   9/25/2018    Mandy Gooden    MRN: [id number]           Patient Information     Date Of Birth          1995        Visit Information        Provider Department      9/25/2018 2:45 PM Rafy Warner MD [facility] Maternal Fetal Medicine [facility]        Today's Diagnoses     Late prenatal care affecting pregnancy in third trimester    -  1    RBADY (amniotic fluid index) borderline low           Follow-ups after your visit        Your next 10 appointments already scheduled     Oct 09, 2018  4:30 PM CDT   [facility] Established Prenatal with ACROLINE Canas CNM   [facility]    [address] [place] [address]  [address]  [place] [address]   [phone]            Oct 15, 2018  6:15 PM CDT   [facility] OB-GYN Established Prenatal with CAROLINE Canas CNM   [facility])    [address] [place] [address]  [address]  [place] [address] [phone]            Oct 17, 2018  1:30 PM CDT   MFM US COMPRE SINGLE F/U with MFMUSR3   [facility] Maternal Fetal Medicine Ultrasound - [facility] ([facility])    [address]  [facility] [address] [phone]           Wear comfortable clothes and leave your valuables at home.            Oct 17, 2018  2:00 PM CDT   [facility]   [facility] Maternal Fetal Medicine [facility] ([facility])    [address]  [facility] [address] [phone]           Please arrive at the time given for your first appointment. This visit is used internally to schedule the physician's time during your ultrasound.            Oct 22, 2018  6:15 PM CDT   [facility] OB-GYN Established Prenatal with CAROLINE Canas CNM   [facility] [place] ([facility])    [address] [place] [address]  [address]  [place] [address] [phone]            Oct 29, 2018  6:15 PM CDT   Juanjose  OB-GYN Established Prenatal with CAROLINE Canas CNM   [facility] [place] ([facility])    [address]  [address]  [facility] [address] [phone]              Future tests that were ordered for you today     Open Future Orders        Priority Expected Expires Ordered    [facility] Comprehensive Single F/U Routine 10/16/2018 9/25/2019 9/25/2018            Who to contact     If you have questions or need follow up information about today's clinic visit or your schedule please contact [employer] [facility] directly at [phone].  Normal or non-critical lab and imaging results will be communicated to you by MyChart, letter or phone within 4 business days after the clinic has received the results. If you do not hear from us within 7 days, please contact the clinic through [email or web address] or phone. If you have a critical or abnormal lab result, we will notify you by phone as soon as possible.  Submit refill requests through [employer] or call your pharmacy and they will forward the refill request to us. Please allow 3 business days for your refill to be completed.          Additional Information About Your Visit        [email or web address] Information     [employer] gives you secure access to your electronic health record. If you see a primary care provider, you can also send messages to your care team and make appointments. If you have questions, please call your primary care clinic.  If you do not have a primary care provider, please call [phone] and they will assist you.        Care EveryWhere ID     This is your Care EveryWhere ID. This could be used by other organizations to access your [facility] medical records  [id number]        Your Vitals Were     Last Period                   01/01/2018            Blood Pressure from Last 3 Encounters:   09/24/18 104/62   09/17/18 104/56   08/09/18 102/64    Weight from Last 3 Encounters:   09/24/18 77.1 kg (170 lb)   09/17/18 76.6 kg  (168 lb 14.4 oz)   08/09/18 74.5 kg (164 lb 4.8 oz)               Primary Care Provider Office Phone # Fax #    Po Yen [phone] [address] [place] [address]        Equal Access to Services     [facility]: Hadii aad ku hadasho Soomaali, waaxda luqadaha, qaybta kaalmada adeegyada, waxay idiin hayaan adebill augustinaalexandra lanayen ah. So [facility] [phone].    ATENCIÓN: Si habla español, tiene a mckenzie disposición servicios gratuitos de asistencia lingüística. Llame al [phone].    We comply with applicable federal civil rights laws and [place] laws. We do not discriminate on the basis of race, color, national origin, age, disability, sex, sexual orientation, or gender identity.            Thank you!     Thank you for choosing [facility]  for your care. Our goal is always to provide you with excellent care. Hearing back from our patients is one way we can continue to improve our services. Please take a few minutes to complete the written survey that you may receive in the mail after your visit with us. Thank you!             Your Updated Medication List - Protect others around you: Learn how to safely use, store and throw away your medicines at www.disposemymeds.org.          This list is accurate as of 9/25/18  3:08 PM.  Always use your most recent med list.                   Brand Name Dispense Instructions for use Diagnosis    prenatal multivitamin plus iron 27-0.8 MG Tabs per tablet     100 tablet    Take 1 tablet by mouth daily    Pregnant on abdominal palpation in second trimester

## 2018-10-05 ENCOUNTER — PRENATAL OFFICE VISIT (OUTPATIENT)
Dept: OBGYN | Facility: CLINIC | Age: 23
End: 2018-10-05
Payer: COMMERCIAL

## 2018-10-05 VITALS — WEIGHT: 172.5 LBS | SYSTOLIC BLOOD PRESSURE: 104 MMHG | DIASTOLIC BLOOD PRESSURE: 60 MMHG | BODY MASS INDEX: 33.56 KG/M2

## 2018-10-05 DIAGNOSIS — O41.03X0 OLIGOHYDRAMNIOS IN THIRD TRIMESTER, SINGLE OR UNSPECIFIED FETUS: ICD-10-CM

## 2018-10-05 DIAGNOSIS — Z34.83 ENCOUNTER FOR SUPERVISION OF OTHER NORMAL PREGNANCY IN THIRD TRIMESTER: Primary | ICD-10-CM

## 2018-10-05 DIAGNOSIS — O09.30 LATE PRENATAL CARE: ICD-10-CM

## 2018-10-05 PROCEDURE — 87653 STREP B DNA AMP PROBE: CPT | Performed by: OBSTETRICS & GYNECOLOGY

## 2018-10-05 PROCEDURE — 99207 ZZC PRENATAL VISIT: CPT | Performed by: OBSTETRICS & GYNECOLOGY

## 2018-10-05 NOTE — NURSING NOTE
"Chief Complaint   Patient presents with     Prenatal Care   36w2d  GBS today  Also, 4 days ago patient notice sm amount of blood when wiping.    initial /60  Wt 172 lb 8 oz (78.2 kg)  LMP 01/01/2018  BMI 33.56 kg/m2 Estimated body mass index is 33.56 kg/(m^2) as calculated from the following:    Height as of 7/23/18: 5' 0.12\" (1.527 m).    Weight as of this encounter: 172 lb 8 oz (78.2 kg).  BP completed using cuff size regular.  Laverne Faulkner CMA    "

## 2018-10-05 NOTE — PROGRESS NOTES
Concerns: none  .  Doing well.  No concerns today.  No vaginal bleeding, LOF, contractions.  Cervix is posterior, finger-tip dilated and soft.  GBS done today.  Labor precautions discussed.  RTC 1 week.  Prenatal flowsheet information is reviewed.  Has MFM follow up next week for oligohydramnios  Mc Bean MD

## 2018-10-07 LAB
GP B STREP DNA SPEC QL NAA+PROBE: NEGATIVE
SPECIMEN SOURCE: NORMAL

## 2018-10-13 ENCOUNTER — NURSE TRIAGE (OUTPATIENT)
Dept: NURSING | Facility: CLINIC | Age: 23
End: 2018-10-13

## 2018-10-13 ENCOUNTER — HOSPITAL ENCOUNTER (OUTPATIENT)
Facility: CLINIC | Age: 23
Discharge: HOME | End: 2018-10-14
Attending: OBSTETRICS & GYNECOLOGY | Admitting: OBSTETRICS & GYNECOLOGY
Payer: COMMERCIAL

## 2018-10-13 PROCEDURE — 59025 FETAL NON-STRESS TEST: CPT

## 2018-10-13 PROCEDURE — G0463 HOSPITAL OUTPT CLINIC VISIT: HCPCS | Mod: 25

## 2018-10-13 RX ORDER — ONDANSETRON 2 MG/ML
4 INJECTION INTRAMUSCULAR; INTRAVENOUS EVERY 6 HOURS PRN
Status: DISCONTINUED | OUTPATIENT
Start: 2018-10-13 | End: 2018-10-14 | Stop reason: HOSPADM

## 2018-10-13 NOTE — IP AVS SNAPSHOT
[facility] Labor and Delivery    [address]    [facility] [address]    Phone:  [phone]    Fax:  [phone]                                       After Visit Summary   10/13/2018    Mandy Gooden    MRN: [id number]           After Visit Summary Signature Page     I have received my discharge instructions, and my questions have been answered. I have discussed any challenges I see with this plan with the nurse or doctor.    ..........................................................................................................................................  Patient/Patient Representative Signature      ..........................................................................................................................................  Patient Representative Print Name and Relationship to Patient    ..................................................               ................................................  Date                                   Time    ..........................................................................................................................................  Reviewed by Signature/Title    ...................................................              ..............................................  Date                                               Time          22EPIC Rev 08/18

## 2018-10-13 NOTE — IP AVS SNAPSHOT
MRN:[id number]                      After Visit Summary   10/13/2018    Mandy Gooden    MRN: [id number]           Thank you!     Thank you for choosing [facility] for your care. Our goal is always to provide you with excellent care. Hearing back from our patients is one way we can continue to improve our services. Please take a few minutes to complete the written survey that you may receive in the mail after you visit. If you would like to speak to someone directly about your visit please contact Patient Relations at [phone]. Thank you!          Patient Information     Date Of Birth          1995        About your hospital stay     You were admitted on:  October 13, 2018 You last received care in the:  [facility] Labor and Delivery    You were discharged on:  October 14, 2018       Who to Call     For medical emergencies, please call 911.  For non-urgent questions about your medical care, please call your primary care provider or clinic, [phone]          Attending Provider     Provider Specialty    Mc Bean MD OB/Gyn       Primary Care Provider Office Phone # Fax #    Cameliachrista Shama Yen [phone]      Your next 10 appointments already scheduled     Oct 15, 2018  6:15 PM CDT   [place] [facility] Established Prenatal with CAROLINE Canas CNM   [facility]    [address] [place] [address]  [address]  [facility] [address] [phone]            Oct 17, 2018  1:30 PM CDT   MFM US COMPRE SINGLE F/U with RHMFMUSR2   [facility] Maternal [facility]etal [facility] Ultrasound - [facility])    [address] [facility] [address]  [facility] [address] [phone]           Wear comfortable clothes and leave your valuables at home.            Oct 17, 2018  2:00 PM CDT   Radiology MD with  VANESSA TRIVEDI   [facility]etal [facility] - [facility])    [address] [facility] [address]  [facility] [address]   [phone]           Please arrive at the time given for your first appointment. This visit is used internally to schedule the physician's time during your ultrasound.            Oct 26, 2018  4:00 PM CDT   ESTABLISHED PRENATAL with Mc Bean MD   [facility] ([facility])    [address]  [address]  [place] [address] [phone]            Nov 01, 2018  4:00 PM CDT   ESTABLISHED PRENATAL with Mc Bean MD   [facility] ([facility])    [address]  [address]  [place] [address]   [phone]              Further instructions from your care team       Discharge Instruction for Undelivered Patients      You were seen for: Labor Assessment  We Consulted: Vikas Thakur  You had (Test or Medicine):Fetal Monitoring, Uterine Monitoring,  Cervical Exam     Diet:   Drink 8 to 12 glasses of liquids (milk, juice, water) every day.  You may eat meals and snacks.     Activity:  Count fetal kicks everyday (see handout)  Call your doctor or nurse midwife if your baby is moving less than usual.     Call your provider if you notice:  Swelling in your face or increased swelling in your hands or legs.  Headaches that are not relieved by Tylenol (acetaminophen).  Changes in your vision (blurring: seeing spots or stars.)  Nausea (sick to your stomach) and vomiting (throwing up).   Weight gain of 5 pounds or more per week.  Heartburn that doesn't go away.  Signs of bladder infection: pain when you urinate (use the toilet), need to go more often and more urgently.  The bag of downing (rupture of membranes) breaks, or you notice leaking in your underwear.  Bright red blood in your underwear.  Abdominal (lower belly) or stomach pain.  Second (plus) baby: Contractions (tightening) less than 10 minutes apart and getting stronger.  Increase or change in vaginal discharge (note the color and  amount)  Other: Call your doctor with any other questions or concerns.     Follow-up:  As scheduled in the clinic          Pending Results     No orders found for last 3 day(s).            Admission Information     Date & Time Provider Department Dept. Phone    10/13/2018 Mc Bean MD [facility] Labor and Delivery [phone]      Your Vitals Were     Blood Pressure Temperature Respirations Last Period          99/59 97.7  F (36.5  C) (Oral) 18 01/01/2018        MyChart Information     [employer] gives you secure access to your electronic health record. If you see a primary care provider, you can also send messages to your care team and make appointments. If you have questions, please call your primary care clinic.  If you do not have a primary care provider, please call [phone] and they will assist you.        Care EveryWhere ID     This is your Care EveryWhere ID. This could be used by other organizations to access your [place] medical records  [id number]        Equal Access to Services     [facility]: Hadii kyle meloo Sofritz, waaxda luqadaha, qaybta kaalmada adesylvia, shantelle donald . So [facility] [phone].    ATENCIÓN: Si habla español, tiene a mckenzie disposición servicios gratuitos de asistencia lingüística. Llame al [phone].    We comply with applicable federal civil rights laws and [place] laws. We do not discriminate on the basis of race, color, national origin, age, disability, sex, sexual orientation, or gender identity.               Review of your medicines      UNREVIEWED medicines. Ask your doctor about these medicines        Dose / Directions    prenatal multivitamin plus iron 27-0.8 MG Tabs per tablet   Used for:  Pregnant on abdominal palpation in second trimester        Dose:  1 tablet   Take 1 tablet by mouth daily   Quantity:  100 tablet   Refills:  3                Protect others around you: Learn how to safely use, store and throw  away your medicines at www.disposemymeds.org.             Medication List: This is a list of all your medications and when to take them. Check marks below indicate your daily home schedule. Keep this list as a reference.      Medications           Morning Afternoon Evening Bedtime As Needed    prenatal multivitamin plus iron 27-0.8 MG Tabs per tablet   Take 1 tablet by mouth daily                                          More Information        Kick Counts    It s normal to worry about your baby s health. One way you can know your baby s doing well is to record the baby s movements once a day. This is called a kick count. Remember to take your kick count records to all your appointments with your healthcare provider.  How to count kicks  Here are tips for counting kicks:    Choose a time when the baby is active, such as after a meal.     Sit comfortably or lie on your side.     The first time the baby moves, write down the time.     Count each movement until the baby has moved 10 times. This can take from 20 minutes to 2 hours.     Try to do it at the same time each day.  When to call your healthcare provider  Call your healthcare provider right away if you notice any of the following:    Your baby moves fewer than 10 times in 2 hours while you re doing kick counts.    Your baby moves much less often than on the days before.    You have not felt your baby move all day.  Date Last Reviewed: 12/1/2017 2000-2017 [employer]. [address] [place] [address], [place] [address]. All rights reserved. This information is not intended as a substitute for professional medical care. Always follow your healthcare professional's instructions.                Labor and Childbirth: Active Labor  During active labor, your contractions will be stronger and more rhythmic than with early labor. They peak and subside like waves. They may happen 3 to 5 minutes apart and last about 45 to 60 seconds. This part of labor can be hard  work. But it is often shorter than early labor. When you reach active labor, exams and tests will be done to see how you and your baby are doing.     Your cervix may dilate 4 to 8 centimeters during the first part of active labor.   Evaluating you and your baby  An exam tells how you and your baby are responding to contractions. Your blood pressure, temperature, and pulse will be checked. A blood or urine sample may also be taken. A fetal monitor will be used to check your baby s heart rate. Sometimes an IV (intravenous) line is started to give you medicine and fluids.  Moving ahead with labor  You may now feel contractions in your whole stomach instead of just the lower part (like during early labor). If your amniotic sac has not broken already, it may break now. Or, it may be broken for you. To help your baby descend, change position often. Walking or sitting in a rocking chair or recliner may help. You may find it hard to relax even though you are tired. You may also be less interested in talking than you were earlier. If you re having anesthesia, you will get it now.   Special issues during labor  If labor doesn t progress well or a problem arises, you may need a . But your healthcare providers may take certain steps to help you avoid a :    If your cervix isn t dilating, a medicine (oxytocin) may be used to augment labor.    If fetal monitoring shows your baby isn t getting enough oxygen, shifting your body position may help. You may also be given oxygen through a mask.    If you have preeclampsia (a condition that results in high blood pressure, swelling, and other symptoms), you may be given medicines by IV (intravenous). Your healthcare provider may also tell you to lie on your left side.  Responding to contractions  During contractions, try to stay relaxed. Tense muscles use more oxygen, eat up your body s energy, and increase pain. Use the breathing and relaxation techniques you may have  learned. And let your support person know how he or she can help. If you ve had problems during a previous birth, focus on the present. Keep in mind that no 2 births are the same.     Support person s note  Here's how you can help:    Have the mother walk or change positions at least once an hour. This improves circulation and helps the baby descend.    Keep reminding the mother to breathe and relax through each contraction.    Reassure her. Try to keep her from getting anxious or overstressed.    Take care of yourself. Take a short break to eat or go to the bathroom when you need to.    Rest when the mother does. You ll both benefit.   Date Last Reviewed: 8/16/2015 2000-2017 [employer]. [address] [place] [address], [place] [address]. All rights reserved. This information is not intended as a substitute for professional medical care. Always follow your healthcare professional's instructions.

## 2018-10-14 VITALS — RESPIRATION RATE: 18 BRPM | SYSTOLIC BLOOD PRESSURE: 99 MMHG | DIASTOLIC BLOOD PRESSURE: 59 MMHG | TEMPERATURE: 97.7 F

## 2018-10-14 NOTE — PROVIDER NOTIFICATION
10/14/18 0115   Provider Notification   Provider Name/Title Dr. Bean    Method of Notification Phone   Request Evaluate - Remote   Notification Reason SVE;Labor Status;Uterine Activity;Pain   Dr Bean updated on patient status. SVE 1.5/60/-3. No change after 2 hours of walking. Jonathan every 3.5-5 minutes. Pt unaware of contractions, palpate mild. FHR Cat 1. Patient has appointment scheduled in clinic on Monday. Orders to discharge patient home. Reviewed POC with pt and FOB. Discharge instructions reviewed. Pt verbalized understanding.

## 2018-10-14 NOTE — DISCHARGE INSTRUCTIONS
Discharge Instruction for Undelivered Patients      You were seen for: Labor Assessment  We Consulted: Vikas Thakur  You had (Test or Medicine):Fetal Monitoring, Uterine Monitoring,  Cervical Exam     Diet:   Drink 8 to 12 glasses of liquids (milk, juice, water) every day.  You may eat meals and snacks.     Activity:  Count fetal kicks everyday (see handout)  Call your doctor or nurse midwife if your baby is moving less than usual.     Call your provider if you notice:  Swelling in your face or increased swelling in your hands or legs.  Headaches that are not relieved by Tylenol (acetaminophen).  Changes in your vision (blurring: seeing spots or stars.)  Nausea (sick to your stomach) and vomiting (throwing up).   Weight gain of 5 pounds or more per week.  Heartburn that doesn't go away.  Signs of bladder infection: pain when you urinate (use the toilet), need to go more often and more urgently.  The bag of downing (rupture of membranes) breaks, or you notice leaking in your underwear.  Bright red blood in your underwear.  Abdominal (lower belly) or stomach pain.  Second (plus) baby: Contractions (tightening) less than 10 minutes apart and getting stronger.  Increase or change in vaginal discharge (note the color and amount)  Other: Call your doctor with any other questions or concerns.     Follow-up:  As scheduled in the clinic

## 2018-10-14 NOTE — PLAN OF CARE
Data: Patient assessed in the [facility] for uterine contractions.  Cervical exam dilated to 1.5/60/-3.  Membranes intact.  Contractions 3.5-5 minutes. Pt reports not feeling contractions, palpate mild.   Action:  Presumed adequate fetal oxygenation documented (see flow record). Discharge instructions reviewed.  Patient instructed to report change in fetal movement, vaginal leaking of fluid or bleeding, abdominal pain, or any concerns related to the pregnancy to her nurse/physician.    Response: Orders to discharge home per Mc Bean.  Patient verbalized understanding of education and verbalized agreement with plan. Discharged to home at 140.

## 2018-10-14 NOTE — PLAN OF CARE
Data: Patient presented to [facility]: 10/13/2018  9:59 PM.  Reason for maternal/fetal assessment is uterine contractions. Patient reports 3-4 uterine contractions over the last 45 minutes to 1 hour that feel similar to her last labor with her previous child.  Patient is a .  Prenatal record reviewed. Pregnancy has been uncomplicated..  Gestational Age 37w3d. VSS. Fetal movement present. Patient denies leaking of vaginal fluid/rupture of membranes, vaginal bleeding, abdominal pain, pelvic pressure, nausea, vomiting, headache, visual disturbances, epigastric or URQ pain, significant edema. Support person is present.   Action: Verbal consent for EFM. FHT's category 1. UC's mild-moderate with palpation. Triage assessment completed. SVE 1.5/60/-3 which is a change from clinic. Bill of rights reviewed.  Response: Dr. Bean notified regarding admission data. Orders to have patient walk for 2 hours, reassess cervix at that point and discuss plan of care. Patient and significant other agreeable with plan.

## 2018-10-14 NOTE — TELEPHONE ENCOUNTER
"Patient says contractions started 30 minutes ago, but are now every minute? Paged Dr. Bean to [phone] via smart web and recommended patient prepare to come in to Myles Bond RN  [facility]    Reason for Disposition    [1] History of rapid prior delivery AND [2] contractions < 10 minutes apart    Additional Information    Negative: Passed out (i.e., lost consciousness, collapsed and was not responding)    Negative: Shock suspected (e.g., cold/pale/clammy skin, too weak to stand, low BP, rapid pulse)    Negative: Difficult to awaken or acting confused  (e.g., disoriented, slurred speech)    Negative: [1] SEVERE abdominal pain (e.g., excruciating) AND [2] constant AND [3] present > 1 hour    Negative: Severe bleeding (e.g., continuous red blood from vagina, or large blood clots)    Negative: Umbilical cord hanging out of the vagina (shiny, white, curled appearance, \"like telephone cord\")    Negative: Uncontrollable urge to push (i.e., feels like baby is coming out now)    Negative: Can see baby    Negative: Sounds like a life-threatening emergency to the triager    Negative: [1] First baby (primipara) AND [2] contractions < 6 minutes apart  AND [3] present 2 hours    Negative: [1] History of prior delivery (multipara) AND [2] contractions < 10 minutes apart AND [3] present 1 hour    Protocols used: PREGNANCY - LABOR-ADULT-      "

## 2018-10-15 ENCOUNTER — PRENATAL OFFICE VISIT (OUTPATIENT)
Dept: OBGYN | Facility: CLINIC | Age: 23
End: 2018-10-15
Payer: COMMERCIAL

## 2018-10-15 VITALS — WEIGHT: 176 LBS | SYSTOLIC BLOOD PRESSURE: 89 MMHG | BODY MASS INDEX: 34.24 KG/M2 | DIASTOLIC BLOOD PRESSURE: 58 MMHG

## 2018-10-15 DIAGNOSIS — O09.30 LATE PRENATAL CARE: ICD-10-CM

## 2018-10-15 DIAGNOSIS — Z34.83 ENCOUNTER FOR SUPERVISION OF OTHER NORMAL PREGNANCY IN THIRD TRIMESTER: Primary | ICD-10-CM

## 2018-10-15 PROCEDURE — 99207 ZZC PRENATAL VISIT: CPT | Performed by: ADVANCED PRACTICE MIDWIFE

## 2018-10-15 NOTE — PROGRESS NOTES
S: Was seen in Triage this weekend for eval of labor.  Baby active.  Denies uterine cramping, vaginal bleeding or leaking of fluid. No headache, increase in edema, no epigastric pain. Reported thick light green vaginal discharge, denies odor states she has not noticed increased wetness or leaking of fluid.   O: Vitals: BP (!) 89/58 (BP Location: Right arm, Patient Position: Sitting, Cuff Size: Adult Regular)  Wt 176 lb (79.8 kg)  LMP 01/01/2018  Breastfeeding? No  BMI 34.24 kg/m2  BMI= Body mass index is 34.24 kg/(m^2).  Exam:  Constitutional: healthy, alert and no distress  Respiratory: respirations even and unlabored  Gastrointestinal: Abdomen soft, non-tender. Fundus measures appropriate for gestational age. Fetal heart tones 135 heard without difficulty and within normal limits. Cephalic per leopold's maneuver.   : Deferred  Psychiatric: mentation appears normal and affect normal/bright  A: (Z34.83) Encounter for supervision of other normal pregnancy in third trimester  (primary encounter diagnosis)  Comment: wnl  Plan: return to clinic 1 week     (O09.30) Late prenatal care    P: Labor signs and symptoms discussed, aware of numbers to call  Discussed warning signs of PIH/preeclampsia and patient will monitor.  GBS screen completed. Discussed plans for labor.  Reviewed counting fetal movement.  Encouraged patient to call with any questions or concerns.  Has MFM appointment  Weds for f/u oligohydramnios   Return to clinic 1 week with OBGYN Dr Bean.     Elizabeth Dominguez, CAROLINE, CNM

## 2018-10-15 NOTE — MR AVS SNAPSHOT
After Visit Summary   10/15/2018    Mandy Gooden    MRN: [id number]           Patient Information     Date Of Birth          1995        Visit Information        Provider Department      10/15/2018 6:15 PM Elizabeth Dominguez APRN CNM [facility]        Today's Diagnoses     Encounter for supervision of other normal pregnancy in third trimester    -  1    Late prenatal care           Follow-ups after your visit        Follow-up notes from your care team     Return in about 1 week (around 10/22/2018) for Prenatal visit.      Your next 10 appointments already scheduled     Oct 17, 2018  1:30 PM CDT   [facility]   [facility] Maternal Fetal Medicine Ultrasound - [facility] ([facility])    [address]  [facility] [address] [phone]           Wear comfortable clothes and leave your valuables at home.            Oct 17, 2018  2:00 PM CDT   Radiology MD with [facility] MD   [facility] Maternal Fetal Medicine - [facility] ([facility])    [address]  [facility] [address] [phone]           Please arrive at the time given for your first appointment. This visit is used internally to schedule the physician's time during your ultrasound.            Oct 26, 2018  4:00 PM CDT   ESTABLISHED PRENATAL with Mc Bean MD   [facility]    [address] [place] [address]  [address]  [facility] [address] [phone]            Nov 01, 2018  4:00 PM CDT   ESTABLISHED PRENATAL with Mc Bean MD   [facility]    [address] [place] [address]  [address]  [facility] [address] [phone]              Who to contact     If you have questions or need follow up information about today's clinic visit or your schedule please contact [facility] directly at [phone].  Normal or non-critical lab and imaging  results will be communicated to you by MyChart, letter or phone within 4 business days after the clinic has received the results. If you do not hear from us within 7 days, please contact the clinic through Bitzer Mobile or phone. If you have a critical or abnormal lab result, we will notify you by phone as soon as possible.  Submit refill requests through Bitzer Mobile or call your pharmacy and they will forward the refill request to us. Please allow 3 business days for your refill to be completed.          Additional Information About Your Visit        Bitzer Mobile Information     B[employer] Mobile gives you secure access to your electronic health record. If you see a primary care provider, you can also send messages to your care team and make appointments. If you have questions, please call your primary care clinic.  If you do not have a primary care provider, please call [phone] and they will assist you.        Care EveryWhere ID     This is your Care EveryWhere ID. This could be used by other organizations to access your [place] medical records  [id number]        Your Vitals Were     Last Period Breastfeeding? BMI (Body Mass Index)             01/01/2018 No 34.24 kg/m2          Blood Pressure from Last 3 Encounters:   10/15/18 (!) 89/58   10/14/18 99/59   10/05/18 104/60    Weight from Last 3 Encounters:   10/15/18 176 lb (79.8 kg)   10/05/18 172 lb 8 oz (78.2 kg)   09/24/18 170 lb (77.1 kg)              Today, you had the following     No orders found for display       Primary Care Provider Office Phone # Fax #    Cameliachrista Shama Yen [phone] [address] [place] [address]        Equal Access to Services     [facility] AH: Hadii kyle cano hadtamika Sofritz, waaxda luqadaha, qaybta kaalmada shantelle mcgee. So [facility] [phone].    ATENCIÓN: Si habla español, tiene a mckenzie disposición servicios gratuitos de asistencia lingüística. Llame al [phone].    We  comply with applicable federal civil rights laws and [place] laws. We do not discriminate on the basis of race, color, national origin, age, disability, sex, sexual orientation, or gender identity.            Thank you!     Thank you for choosing [facility]  for your care. Our goal is always to provide you with excellent care. Hearing back from our patients is one way we can continue to improve our services. Please take a few minutes to complete the written survey that you may receive in the mail after your visit with us. Thank you!             Your Updated Medication List - Protect others around you: Learn how to safely use, store and throw away your medicines at www.disposemymeds.org.          This list is accurate as of 10/15/18  7:15 PM.  Always use your most recent med list.                   Brand Name Dispense Instructions for use Diagnosis    prenatal multivitamin plus iron 27-0.8 MG Tabs per tablet     100 tablet    Take 1 tablet by mouth daily    Pregnant on abdominal palpation in second trimester

## 2018-10-15 NOTE — NURSING NOTE
"Chief Complaint   Patient presents with     Prenatal Care     37 weeks 5 days- concerns of light green discharge        Initial BP (!) 89/58 (BP Location: Right arm, Patient Position: Sitting, Cuff Size: Adult Regular)  Wt 176 lb (79.8 kg)  LMP 2018  Breastfeeding? No  BMI 34.24 kg/m2 Estimated body mass index is 34.24 kg/(m^2) as calculated from the following:    Height as of 18: 5' 0.12\" (1.527 m).    Weight as of this encounter: 176 lb (79.8 kg).  BP completed using cuff size: regular        The following HM Due: NONE    37w5d  Ruiz Smith CMA                "

## 2018-10-17 ENCOUNTER — OFFICE VISIT (OUTPATIENT)
Dept: MATERNAL FETAL MEDICINE | Facility: CLINIC | Age: 23
End: 2018-10-17
Attending: OBSTETRICS & GYNECOLOGY
Payer: COMMERCIAL

## 2018-10-17 ENCOUNTER — HOSPITAL ENCOUNTER (OUTPATIENT)
Dept: ULTRASOUND IMAGING | Facility: CLINIC | Age: 23
Discharge: HOME | End: 2018-10-17
Attending: OBSTETRICS & GYNECOLOGY | Admitting: OBSTETRICS & GYNECOLOGY
Payer: COMMERCIAL

## 2018-10-17 DIAGNOSIS — O09.33 LATE PRENATAL CARE AFFECTING PREGNANCY IN THIRD TRIMESTER: Primary | ICD-10-CM

## 2018-10-17 DIAGNOSIS — Z03.79 SUSPECTED FETAL CONDITION NOT FOUND: ICD-10-CM

## 2018-10-17 DIAGNOSIS — O09.33 LATE PRENATAL CARE AFFECTING PREGNANCY IN THIRD TRIMESTER: ICD-10-CM

## 2018-10-17 PROCEDURE — 76816 OB US FOLLOW-UP PER FETUS: CPT

## 2018-10-17 NOTE — MR AVS SNAPSHOT
After Visit Summary   10/17/2018    Mandy Gooden    MRN: [id number]           Patient Information     Date Of Birth          1995        Visit Information        Provider Department      10/17/2018 2:00 PM Diandra Miranda DO [facility] Maternal Fetal Medicine [facility]        Today's Diagnoses     Late prenatal care affecting pregnancy in third trimester    -  1    Suspected fetal condition not found           Follow-ups after your visit        Your next 10 appointments already scheduled     Oct 17, 2018  2:00 PM CDT   Radiology MD with Diandra Miranda DO   [facility] Maternal Fetal [facility] ([facility])    [address] [facility] [address]  [facility] [address]   [phone]           Please arrive at the time given for your first appointment. This visit is used internally to schedule the physician's time during your ultrasound.            Oct 26, 2018  4:00 PM CDT   ESTABLISHED PRENATAL with Mc Bean MD   [facility] ([facility])    [address]  [address]  [facility] [address] [phone]            Nov 01, 2018  4:00 PM CDT   ESTABLISHED PRENATAL with Mc Bean MD   [facility]    [address]  [address]  [facility] [address] [phone]              Who to contact     If you have questions or need follow up information about today's clinic visit or your schedule please contact [facility] [place] directly at [phone].  Normal or non-critical lab and imaging results will be communicated to you by MyChart, letter or phone within 4 business days after the clinic has received the results. If you do not hear from us within 7 days, please contact the clinic through MyChart or phone. If you have a critical or abnormal lab result, we will notify you by phone as soon as possible.  Submit refill requests through Extolehart or call your  pharmacy and they will forward the refill request to us. Please allow 3 business days for your refill to be completed.          Additional Information About Your Visit        Design2Launchhart Information     [employer]2Launchhart gives you secure access to your electronic health record. If you see a primary care provider, you can also send messages to your care team and make appointments. If you have questions, please call your primary care clinic.  If you do not have a primary care provider, please call [phone] and they will assist you.        Care EveryWhere ID     This is your Care EveryWhere ID. This could be used by other organizations to access your [place] medical records  [id number]        Your Vitals Were     Last Period                   01/01/2018            Blood Pressure from Last 3 Encounters:   10/15/18 (!) 89/58   10/14/18 99/59   10/05/18 104/60    Weight from Last 3 Encounters:   10/15/18 79.8 kg (176 lb)   10/05/18 78.2 kg (172 lb 8 oz)   09/24/18 77.1 kg (170 lb)              Today, you had the following     No orders found for display       Primary Care Provider Office Phone # Fax #    Po Yen [phone] [address] [place] [address]        Equal Access to Services     [facility] AH: Hadii kyle ku hadasho Soomaali, waaxda luqadaha, qaybta kaalmada adeegyada, shantelle singh. So [facility] [phone].    ATENCIÓN: Si habla español, tiene a mckenzie disposición servicios gratuitos de asistencia lingüística. Llame al [phone].    We comply with applicable federal civil rights laws and [place] laws. We do not discriminate on the basis of race, color, national origin, age, disability, sex, sexual orientation, or gender identity.            Thank you!     Thank you for choosing [facility]  for your care. Our goal is always to provide you with excellent care. Hearing back from our patients is one way we can continue  to improve our services. Please take a few minutes to complete the written survey that you may receive in the mail after your visit with us. Thank you!             Your Updated Medication List - Protect others around you: Learn how to safely use, store and throw away your medicines at www.disposemymeds.org.          This list is accurate as of 10/17/18  1:49 PM.  Always use your most recent med list.                   Brand Name Dispense Instructions for use Diagnosis    prenatal multivitamin plus iron 27-0.8 MG Tabs per tablet     100 tablet    Take 1 tablet by mouth daily    Pregnant on abdominal palpation in second trimester

## 2018-10-17 NOTE — PROGRESS NOTES
"Please see \"Imaging\" tab under \"Chart Review\" for details of today's US.      Diandra Miranda, DO  Maternal-Fetal Medicine        "

## 2018-10-26 PROBLEM — O42.90 RUPTURE OF MEMBRANES WITH DELAY OF DELIVERY: Status: ACTIVE | Noted: 2018-10-26

## 2018-11-01 ENCOUNTER — PRENATAL OFFICE VISIT (OUTPATIENT)
Dept: OBGYN | Facility: CLINIC | Age: 23
End: 2018-11-01
Payer: COMMERCIAL

## 2018-11-01 ENCOUNTER — TELEPHONE (OUTPATIENT)
Dept: OBGYN | Facility: CLINIC | Age: 23
End: 2018-11-01

## 2018-11-01 VITALS — WEIGHT: 177.1 LBS | DIASTOLIC BLOOD PRESSURE: 60 MMHG | BODY MASS INDEX: 34.45 KG/M2 | SYSTOLIC BLOOD PRESSURE: 112 MMHG

## 2018-11-01 DIAGNOSIS — Z34.83 ENCOUNTER FOR SUPERVISION OF OTHER NORMAL PREGNANCY IN THIRD TRIMESTER: Primary | ICD-10-CM

## 2018-11-01 DIAGNOSIS — O09.30 LATE PRENATAL CARE: ICD-10-CM

## 2018-11-01 PROCEDURE — 99207 ZZC PRENATAL VISIT: CPT | Performed by: OBSTETRICS & GYNECOLOGY

## 2018-11-01 NOTE — MR AVS SNAPSHOT
After Visit Summary   11/1/2018    Mandy Gooden    MRN: [id number]           Patient Information     Date Of Birth          1995        Visit Information        Provider Department      11/1/2018 4:00 PM Mc Bean MD [facility]        Today's Diagnoses     Encounter for supervision of other normal pregnancy in third trimester    -  1    Late prenatal care           Follow-ups after your visit        Who to contact     If you have questions or need follow up information about today's clinic visit or your schedule please contact [facility] directly at [phone].  Normal or non-critical lab and imaging results will be communicated to you by [email or web address], letter or phone within 4 business days after the clinic has received the results. If you do not hear from us within 7 days, please contact the clinic through [email or web address] or phone. If you have a critical or abnormal lab result, we will notify you by phone as soon as possible.  Submit refill requests through Moblication or call your pharmacy and they will forward the refill request to us. Please allow 3 business days for your refill to be completed.          Additional Information About Your Visit        MyChart Information     Moblication gives you secure access to your electronic health record. If you see a primary care provider, you can also send messages to your care team and make appointments. If you have questions, please call your primary care clinic.  If you do not have a primary care provider, please call [phone] and they will assist you.        Care EveryWhere ID     This is your Care EveryWhere ID. This could be used by other organizations to access your [place] medical records  [id number]        Your Vitals Were     Last Period BMI (Body Mass Index)                01/01/2018 34.45 kg/m2           Blood Pressure from Last 3 Encounters:   11/01/18 112/60   10/15/18 (!) 89/58   10/14/18 99/59    Weight from  Last 3 Encounters:   11/01/18 177 lb 1.6 oz (80.3 kg)   10/15/18 176 lb (79.8 kg)   10/05/18 172 lb 8 oz (78.2 kg)              Today, you had the following     No orders found for display       Primary Care Provider Office Phone # Fax #    CAROLINE Canas [phone]       [address] [facility] [address]        Equal Access to Services     LETTY DAVIS : Hadii aad ku hadasho Soomaali, waaxda luqadaha, qaybta kaalmada adeegyada, waxay idiin hayaan adeeg kharash la'cole . So [facility] [phone].    ATENCIÓN: Si habla español, tiene a mckenzie disposición servicios gratuitos de asistencia lingüística. [facility] [phone].    We comply with applicable federal civil rights laws and [place] laws. We do not discriminate on the basis of race, color, national origin, age, disability, sex, sexual orientation, or gender identity.            Thank you!     Thank you for choosing [facility]  for your care. Our goal is always to provide you with excellent care. Hearing back from our patients is one way we can continue to improve our services. Please take a few minutes to complete the written survey that you may receive in the mail after your visit with us. Thank you!             Your Updated Medication List - Protect others around you: Learn how to safely use, store and throw away your medicines at www.disposemymeds.org.          This list is accurate as of 11/1/18  4:18 PM.  Always use your most recent med list.                   Brand Name Dispense Instructions for use Diagnosis    prenatal multivitamin plus iron 27-0.8 MG Tabs per tablet     100 tablet    Take 1 tablet by mouth daily    Pregnant on abdominal palpation in second trimester

## 2018-11-01 NOTE — NURSING NOTE
"Chief Complaint   Patient presents with     Prenatal Care   40w1d  C/o on and off contractions x 1 week  Discuss delivery plan / induction    Initial /60  Wt 177 lb 1.6 oz (80.3 kg)  LMP 2018  BMI 34.45 kg/m2 Estimated body mass index is 34.45 kg/(m^2) as calculated from the following:    Height as of 18: 5' 0.12\" (1.527 m).    Weight as of this encounter: 177 lb 1.6 oz (80.3 kg).  BP completed using cuff size: regular    Questioned patient about current smoking habits.  Pt. has never smoked.          The following HM Due: NONE      Laverne Faulkner CMA             "

## 2018-11-01 NOTE — PROGRESS NOTES
No problems    Oligohydramnyios resolved on 10/17/18 scan  Discussed postdates management  Induction scheduled for 11/7/18 if undelivered

## 2018-11-01 NOTE — TELEPHONE ENCOUNTER
Induction smart phrase   Procedure: Induction  Date: 11/7/2018  Time: 7:30am  Hospital: [facility]  Orders faxed and the patient was advised to call [facility] [phone] labor and delivery department one hour prior to arrival.  Added to [place] Calendar  Laverne Faulkner CMA

## 2018-11-02 ENCOUNTER — HOSPITAL ENCOUNTER (INPATIENT)
Facility: CLINIC | Age: 23
LOS: 1 days | Discharge: HOME HEALTH | End: 2018-11-03
Attending: OBSTETRICS & GYNECOLOGY | Admitting: OBSTETRICS & GYNECOLOGY
Payer: COMMERCIAL

## 2018-11-02 ENCOUNTER — TELEPHONE (OUTPATIENT)
Dept: OBGYN | Facility: CLINIC | Age: 23
End: 2018-11-02

## 2018-11-02 LAB
ABO + RH BLD: NORMAL
ABO + RH BLD: NORMAL
AMPHETAMINES UR QL SCN: NEGATIVE
CANNABINOIDS UR QL: NEGATIVE
COCAINE UR QL: NEGATIVE
OPIATES UR QL SCN: NEGATIVE
PCP UR QL SCN: NEGATIVE
SPECIMEN EXP DATE BLD: NORMAL

## 2018-11-02 PROCEDURE — 10907ZC DRAINAGE OF AMNIOTIC FLUID, THERAPEUTIC FROM PRODUCTS OF CONCEPTION, VIA NATURAL OR ARTIFICIAL OPENING: ICD-10-PCS | Performed by: ADVANCED PRACTICE MIDWIFE

## 2018-11-02 PROCEDURE — 86901 BLOOD TYPING SEROLOGIC RH(D): CPT | Performed by: OBSTETRICS & GYNECOLOGY

## 2018-11-02 PROCEDURE — 59400 OBSTETRICAL CARE: CPT | Performed by: ADVANCED PRACTICE MIDWIFE

## 2018-11-02 PROCEDURE — 25000125 ZZHC RX 250: Performed by: OBSTETRICS & GYNECOLOGY

## 2018-11-02 PROCEDURE — 12000029 ZZH R&B OB INTERMEDIATE

## 2018-11-02 PROCEDURE — 86780 TREPONEMA PALLIDUM: CPT | Performed by: OBSTETRICS & GYNECOLOGY

## 2018-11-02 PROCEDURE — 72200001 ZZH LABOR CARE VAGINAL DELIVERY SINGLE

## 2018-11-02 PROCEDURE — 0HQ9XZZ REPAIR PERINEUM SKIN, EXTERNAL APPROACH: ICD-10-PCS | Performed by: ADVANCED PRACTICE MIDWIFE

## 2018-11-02 PROCEDURE — 25000132 ZZH RX MED GY IP 250 OP 250 PS 637: Performed by: ADVANCED PRACTICE MIDWIFE

## 2018-11-02 PROCEDURE — 86900 BLOOD TYPING SEROLOGIC ABO: CPT | Performed by: OBSTETRICS & GYNECOLOGY

## 2018-11-02 PROCEDURE — 25000132 ZZH RX MED GY IP 250 OP 250 PS 637: Performed by: OBSTETRICS & GYNECOLOGY

## 2018-11-02 PROCEDURE — 36415 COLL VENOUS BLD VENIPUNCTURE: CPT | Performed by: OBSTETRICS & GYNECOLOGY

## 2018-11-02 PROCEDURE — 80307 DRUG TEST PRSMV CHEM ANLYZR: CPT | Performed by: OBSTETRICS & GYNECOLOGY

## 2018-11-02 RX ORDER — ONDANSETRON 2 MG/ML
4 INJECTION INTRAMUSCULAR; INTRAVENOUS EVERY 6 HOURS PRN
Status: DISCONTINUED | OUTPATIENT
Start: 2018-11-02 | End: 2018-11-02

## 2018-11-02 RX ORDER — ACETAMINOPHEN 325 MG/1
650 TABLET ORAL EVERY 4 HOURS PRN
Status: DISCONTINUED | OUTPATIENT
Start: 2018-11-02 | End: 2018-11-02

## 2018-11-02 RX ORDER — ACETAMINOPHEN 325 MG/1
650 TABLET ORAL EVERY 4 HOURS PRN
Status: DISCONTINUED | OUTPATIENT
Start: 2018-11-02 | End: 2018-11-03 | Stop reason: HOSPADM

## 2018-11-02 RX ORDER — NALOXONE HYDROCHLORIDE 0.4 MG/ML
.1-.4 INJECTION, SOLUTION INTRAMUSCULAR; INTRAVENOUS; SUBCUTANEOUS
Status: DISCONTINUED | OUTPATIENT
Start: 2018-11-02 | End: 2018-11-03 | Stop reason: HOSPADM

## 2018-11-02 RX ORDER — CARBOPROST TROMETHAMINE 250 UG/ML
250 INJECTION, SOLUTION INTRAMUSCULAR
Status: DISCONTINUED | OUTPATIENT
Start: 2018-11-02 | End: 2018-11-02

## 2018-11-02 RX ORDER — IBUPROFEN 800 MG/1
800 TABLET, FILM COATED ORAL EVERY 6 HOURS PRN
Status: DISCONTINUED | OUTPATIENT
Start: 2018-11-02 | End: 2018-11-03 | Stop reason: HOSPADM

## 2018-11-02 RX ORDER — NALOXONE HYDROCHLORIDE 0.4 MG/ML
.1-.4 INJECTION, SOLUTION INTRAMUSCULAR; INTRAVENOUS; SUBCUTANEOUS
Status: DISCONTINUED | OUTPATIENT
Start: 2018-11-02 | End: 2018-11-02

## 2018-11-02 RX ORDER — OXYTOCIN/0.9 % SODIUM CHLORIDE 30/500 ML
100-340 PLASTIC BAG, INJECTION (ML) INTRAVENOUS CONTINUOUS PRN
Status: DISCONTINUED | OUTPATIENT
Start: 2018-11-02 | End: 2018-11-02

## 2018-11-02 RX ORDER — METHYLERGONOVINE MALEATE 0.2 MG/ML
200 INJECTION INTRAVENOUS
Status: DISCONTINUED | OUTPATIENT
Start: 2018-11-02 | End: 2018-11-02

## 2018-11-02 RX ORDER — OXYTOCIN 10 [USP'U]/ML
10 INJECTION, SOLUTION INTRAMUSCULAR; INTRAVENOUS
Status: DISCONTINUED | OUTPATIENT
Start: 2018-11-02 | End: 2018-11-03 | Stop reason: HOSPADM

## 2018-11-02 RX ORDER — IBUPROFEN 800 MG/1
800 TABLET, FILM COATED ORAL
Status: COMPLETED | OUTPATIENT
Start: 2018-11-02 | End: 2018-11-02

## 2018-11-02 RX ORDER — OXYTOCIN/0.9 % SODIUM CHLORIDE 30/500 ML
100 PLASTIC BAG, INJECTION (ML) INTRAVENOUS CONTINUOUS
Status: DISCONTINUED | OUTPATIENT
Start: 2018-11-02 | End: 2018-11-03 | Stop reason: HOSPADM

## 2018-11-02 RX ORDER — SODIUM CHLORIDE, SODIUM LACTATE, POTASSIUM CHLORIDE, CALCIUM CHLORIDE 600; 310; 30; 20 MG/100ML; MG/100ML; MG/100ML; MG/100ML
INJECTION, SOLUTION INTRAVENOUS CONTINUOUS
Status: DISCONTINUED | OUTPATIENT
Start: 2018-11-02 | End: 2018-11-02

## 2018-11-02 RX ORDER — HYDROCORTISONE 2.5 %
CREAM (GRAM) TOPICAL 3 TIMES DAILY PRN
Status: DISCONTINUED | OUTPATIENT
Start: 2018-11-02 | End: 2018-11-03 | Stop reason: HOSPADM

## 2018-11-02 RX ORDER — LANOLIN 100 %
OINTMENT (GRAM) TOPICAL
Status: DISCONTINUED | OUTPATIENT
Start: 2018-11-02 | End: 2018-11-03 | Stop reason: HOSPADM

## 2018-11-02 RX ORDER — AMOXICILLIN 250 MG
2 CAPSULE ORAL 2 TIMES DAILY
Status: DISCONTINUED | OUTPATIENT
Start: 2018-11-02 | End: 2018-11-03 | Stop reason: HOSPADM

## 2018-11-02 RX ORDER — FENTANYL CITRATE 50 UG/ML
50-100 INJECTION, SOLUTION INTRAMUSCULAR; INTRAVENOUS
Status: DISCONTINUED | OUTPATIENT
Start: 2018-11-02 | End: 2018-11-02

## 2018-11-02 RX ORDER — AMOXICILLIN 250 MG
1 CAPSULE ORAL 2 TIMES DAILY
Status: DISCONTINUED | OUTPATIENT
Start: 2018-11-02 | End: 2018-11-03 | Stop reason: HOSPADM

## 2018-11-02 RX ORDER — OXYTOCIN 10 [USP'U]/ML
10 INJECTION, SOLUTION INTRAMUSCULAR; INTRAVENOUS
Status: DISCONTINUED | OUTPATIENT
Start: 2018-11-02 | End: 2018-11-02

## 2018-11-02 RX ORDER — OXYTOCIN/0.9 % SODIUM CHLORIDE 30/500 ML
340 PLASTIC BAG, INJECTION (ML) INTRAVENOUS CONTINUOUS PRN
Status: DISCONTINUED | OUTPATIENT
Start: 2018-11-02 | End: 2018-11-03 | Stop reason: HOSPADM

## 2018-11-02 RX ORDER — BISACODYL 10 MG
10 SUPPOSITORY, RECTAL RECTAL DAILY PRN
Status: DISCONTINUED | OUTPATIENT
Start: 2018-11-04 | End: 2018-11-03 | Stop reason: HOSPADM

## 2018-11-02 RX ORDER — OXYCODONE AND ACETAMINOPHEN 5; 325 MG/1; MG/1
1 TABLET ORAL
Status: DISCONTINUED | OUTPATIENT
Start: 2018-11-02 | End: 2018-11-02

## 2018-11-02 RX ADMIN — IBUPROFEN 800 MG: 800 TABLET ORAL at 16:40

## 2018-11-02 RX ADMIN — LIDOCAINE HYDROCHLORIDE 1 ML: 10 INJECTION, SOLUTION EPIDURAL; INFILTRATION; INTRACAUDAL; PERINEURAL at 16:07

## 2018-11-02 RX ADMIN — IBUPROFEN 800 MG: 800 TABLET ORAL at 22:12

## 2018-11-02 RX ADMIN — ACETAMINOPHEN 650 MG: 325 TABLET, FILM COATED ORAL at 18:16

## 2018-11-02 RX ADMIN — SENNOSIDES AND DOCUSATE SODIUM 1 TABLET: 8.6; 5 TABLET ORAL at 22:12

## 2018-11-02 NOTE — H&P
"     OB Admit History & Physical  2018    Mandy Gooden  [id number]    History of Present Illness:   Mandy Gooden  Is a 23 year old female who is  being seen for spontaneous labor.  Her Estimated Date of Delivery is Oct 31, 2018, and gestational age is 40w2d.  Her last menstrual period was Patient's last menstrual period was 2018..     OB History:   Estimated body mass index is 34.45 kg/(m^2) as calculated from the following:    Height as of 18: 1.527 m (5' 0.12\").    Weight as of 18: 80.3 kg (177 lb 1.6 oz).  Obstetric History       T0      L1     SAB0   TAB0   Ectopic0   Multiple0   Live Births1       # Outcome Date GA Lbr Inocencio/2nd Weight Sex Delivery Anes PTL Lv   2 Current            1 Para 17   2.863 kg (6 lb 5 oz) M Vag-Spont   PROMISE          Her prenatal course has been complicated by late prenatal care and olighydramnios which resolved on follow up scan..  Estimated fetal weight =  7 lbs    Past Medical History:   History reviewed. No pertinent past medical history.  History reviewed. No pertinent surgical history.    Medications:   No current outpatient prescriptions on file.       Allergies:   Review of patient's allergies indicates no known allergies.          Physical Exam:  Vitals:  Blood pressure 114/65, temperature 98.1  F (36.7  C), temperature source Oral, last menstrual period 2018, not currently breastfeeding.   FHT rate at 150 bpm ,with contractions every  5min.  GEN: Alert Awake in NAD  Cervix and Dilation:  4/75/-2  AROM clear  Ext NT  Labs:   Hemoglobin   Date Value Ref Range Status   2018 11.6 (L) 11.7 - 15.7 g/dL Final     Comment:     Results confirmed by repeat test     No results found for: RUBELLAABIGG   Lab Results   Component Value Date    ABO O 2018           Lab Results   Component Value Date    RH Pos 2018        Assessment and Plan:   Intrauterine pregnancy at 40w2d  complicated by late prenatal care who presents " with spontaneous labor.    1. Admit to L&D  2. Monitor progress  3. Anticipate         Mc Bean   Dept of OB/GYN  2018

## 2018-11-02 NOTE — PROVIDER NOTIFICATION
11/02/18 1556   Provider Notification   Provider Name/Title Dr. Bean   Method of Notification At Bedside     Dr. Bean at bedside standby for Elizabeth Dominguez CNM with delivery.

## 2018-11-02 NOTE — IP AVS SNAPSHOT
MRN:[id number]                      After Visit Summary   11/2/2018    Mandy Gooden    MRN: [id number]           Thank you!     Thank you for choosing [facility] for your care. Our goal is always to provide you with excellent care. Hearing back from our patients is one way we can continue to improve our services. Please take a few minutes to complete the written survey that you may receive in the mail after you visit. If you would like to speak to someone directly about your visit please contact Patient Relations at [phone]. Thank you!          Patient Information     Date Of Birth          1995        Designated Caregiver       Most Recent Value    Caregiver    Will someone help with your care after discharge? no      About your hospital stay     You were admitted on:  November 2, 2018 You last received care in the:  [facility]    You were discharged on:  November 3, 2018        Reason for your hospital stay       Maternity care                  Who to Call     For medical emergencies, please call 911.  For non-urgent questions about your medical care, please call your primary care provider or clinic, [phone]          Attending Provider     Provider Specialty    Mc Bean MD OB/Gyn    Elizabeth Dominguez APRN CNM Midwives       Primary Care Provider Office Phone # Fax #    CAROLINE Canas [phone]      After Care Instructions     Activity       Review discharge instructions            Diet       Resume previous diet            Discharge Instructions - Postpartum visit       Schedule postpartum visit with your provider and return to clinic in 2 weeks.                  Further instructions from your care team       Postpartum Vaginal Delivery Instructions   Lactation [phone]    [facility] [phone]  Return to clinic 2&6 weeks     Activity       Ask family and friends for help when you need  it.    Do not place anything in your vagina for 6 weeks.    You are not restricted on other activities, but take it easy for a few weeks to allow your body to recover from delivery.  You are able to do any activities you feel up to that point.    No driving until you have stopped taking your pain medications (usually two weeks after delivery).     Call your health care provider if you have any of these symptoms:       Increased pain, swelling, redness, or fluid around your stiches from an episiotomy or perineal tear.    A fever above 100.4 F (38 C) with or without chills when placing a thermometer under your tongue.    You soak a sanitary pad with blood within 1 hour, or you see blood clots larger than a golf ball.    Bleeding that lasts more than 6 weeks.    Vaginal discharge that smells bad.    Severe pain, cramping or tenderness in your lower belly area.    A need to urinate more frequently (use the toilet more often), more urgently (use the toilet very quickly), or it burns when you urinate.    Nausea and vomiting.    Redness, swelling or pain around a vein in your leg.    Problems breastfeeding or a red or painful area on your breast.    Chest pain and cough or are gasping for air.    Problems coping with sadness, anxiety, or depression.  If you have any concerns about hurting yourself or the baby, call your provider immediately.     You have questions or concerns after you return home.     Keep your hands clean:  Always wash your hands before touching your perineal area and stitches.  This helps reduce your risk of infection.  If your hands aren't dirty, you may use an alcohol hand-rub to clean your hands. Keep your nails clean and short.        Pending Results     No orders found for last 3 day(s).            Statement of Approval     Ordered          11/03/18 1005  I have reviewed and agree with all the recommendations and orders detailed in this document.  EFFECTIVE NOW     Approved and electronically  signed by:  Lani Guzman CNM             Admission Information     Date & Time Provider Department Dept. Phone    11/2/2018 Elizabeth Dominguez APRN CN [facility] [phone]      Your Vitals Were     Blood Pressure Pulse Temperature Respirations Last Period       116/70 72 98.3  F (36.8  C) (Oral) 18 01/01/2018       MyChart Information     [employer] gives you secure access to your electronic health record. If you see a primary care provider, you can also send messages to your care team and make appointments. If you have questions, please call your primary care clinic.  If you do not have a primary care provider, please call [phone] and they will assist you.        Care EveryWhere ID     This is your Care EveryWhere ID. This could be used by other organizations to access your [place] medical records  [id number]        Equal Access to Services     LETTY DAVIS : Kike Brady, cris winter, cely mcgee, shantelle donald . So [facility] [phone].    ATENCIÓN: Si habla español, tiene a mckenzie disposición servicios gratuitos de asistencia lingüística. Lljarod al [phone].    We comply with applicable federal civil rights laws and [place] laws. We do not discriminate on the basis of race, color, national origin, age, disability, sex, sexual orientation, or gender identity.               Review of your medicines      START taking        Dose / Directions    docusate sodium 100 MG tablet   Commonly known as:  COLACE   Used for:  Normal delivery        Dose:  100 mg   Take 100 mg by mouth daily   Quantity:  60 tablet   Refills:  1       ibuprofen 800 MG tablet   Commonly known as:  ADVIL/MOTRIN   Used for:  Normal delivery        Dose:  800 mg   Take 1 tablet (800 mg) by mouth every 8 hours as needed for moderate pain or other (cramping)   Quantity:  90 tablet   Refills:  1         CONTINUE these medicines which have NOT CHANGED         Dose / Directions    prenatal multivitamin plus iron 27-0.8 MG Tabs per tablet   Used for:  Pregnant on abdominal palpation in second trimester        Dose:  1 tablet   Take 1 tablet by mouth daily   Quantity:  100 tablet   Refills:  3            Where to get your medicines      Some of these will need a paper prescription and others can be bought over the counter. Ask your nurse if you have questions.     Bring a paper prescription for each of these medications     docusate sodium 100 MG tablet    ibuprofen 800 MG tablet                Protect others around you: Learn how to safely use, store and throw away your medicines at www.disposemymeds.org.             Medication List: This is a list of all your medications and when to take them. Check marks below indicate your daily home schedule. Keep this list as a reference.      Medications           Morning Afternoon Evening Bedtime As Needed    docusate sodium 100 MG tablet   Commonly known as:  COLACE   Take 100 mg by mouth daily                                ibuprofen 800 MG tablet   Commonly known as:  ADVIL/MOTRIN   Take 1 tablet (800 mg) by mouth every 8 hours as needed for moderate pain or other (cramping)   Last time this was given:  800 mg on 11/3/2018  9:17 AM                                prenatal multivitamin plus iron 27-0.8 MG Tabs per tablet   Take 1 tablet by mouth daily

## 2018-11-02 NOTE — TELEPHONE ENCOUNTER
Pt is 40w2d calling with contractions. This is her second baby. Her contractions started about an hour ago. They have been consistently every 5 minutes and lasting one minute. She rates them 7-8/10. GBS negative. She says her last labor was very fast. Called L&D to notify them. They said they would notify Dr Bean (on call) as he is upstairs with them.         Pallavi Lee RN

## 2018-11-02 NOTE — PROVIDER NOTIFICATION
11/02/18 1320   Provider Notification   Provider Name/Title Elizabeth Dominguez CNM   Method of Notification At Bedside   Elizabeth backing up Dr. Bean while he is in a case. At bedside to evaluate labor progress. Unhooked patient from monitor so should could ambulate. Will return to hook monitor back up around [phone].

## 2018-11-02 NOTE — PLAN OF CARE
Data: Mandy Gooden transferred to 453 via wheelchair at 1830. Baby transferred via parent's arms.  Action: Receiving unit notified of transfer: Yes. Patient and family notified of room change. Belongings sent to receiving unit. Accompanied by Registered Nurse. Oriented patient to surroundings. Call light within reach. ID bands double-checked with receiving RN.  Response: Patient tolerated transfer and is stable.

## 2018-11-02 NOTE — PROVIDER NOTIFICATION
11/02/18 1512   Provider Notification   Provider Name/Title Elizabeth Dominguez CNM   Method of Notification At Bedside   At bedside to check on patient. Patient requested SVE, 9/100/0. Called for RN assistance. CNM ruptured forebag.

## 2018-11-02 NOTE — L&D DELIVERY NOTE
Patient presents as a 23 year old  at 40 w 2d for spontaneous labor.  Requested by Dr Bean to provide bedside support while MD in a case, patient requests CNM for delivery. Dr Bean agrees. Patient's labor progressed augmented with AROM.  She did not receive GBS prophylaxis.  Patient was un medicated, utilizing position changes to facilitate labor. Patient laboring on birthing ball, states she has urge to push. Requested SVE. At 1555 PM patient reached complete dilation.  Patient was able to labor down for 0 minutes.  She began pushing efforts at 1556 PM.  Fetal tracing was Category 1 with moderate variability and early decelerations.  She pushed for a total of 6 minutes.  Nuchal cord easily reduced. At 1559 PM she delivered a viable female infant.  Delayed cord clamping was completed.   Apgars were 8  and 9  at 1 and 5 minutes, respectively.  Cord gases were not not sent.      Baby's birth weight:   Information for the patient's :  Giacomo Baby1 Mandy [[id number]]   0 lbs 0 oz     Active management of the third stage completed with Pitocin started immediately after delivery of placenta.  Placenta delivered spontaneously with cord traction.  Placenta was noted to intact.  Perineum was inspected and patient had a 1st degree tear.  Lidotaine used for anesthesia.  Tear was repaired in the normal fashion using 3-0 vicryl.  Hemostasis was noted.  QBL performed with 50 cc.  Debrief was completed at end of procedure.      CAROLINE Reyes, CN     Delivery Summary - No Siddiqui  Delivery Summary    Mandy Gooden MRN# [id number]   Age: 23 year old YOB: 1995     Labor Event Times:    Labor Onset Date       Labor Onset Time    Dilation Complete Date    Dilation Complete Time       Start Pushing Date        Start Pushing Time            Labor Length:    1st Stage (hrs/min)     2nd Stage (hrs/min)     3rd Stage (hrs/min)         Labor Events:     Labor No   Rupture Date     Rupture Time     Rupture  Type Artificial Rupture of Membranes   Fluid Color     Labor Type     Induction    Induction Indication         Augmentation    Labor Complications     Additional Complications     Management of Labor        Antibiotics     IV Antibiotic Given     Additional Management     Fetal Status Prior to  Delivery     Fetal Status Comments         Cervical Ripening:    Date     Time     Type         Delivery:    Episiotomy None   Local Anesthetic        Lacerations 1st   Sponge Count Correct       Needle Count Correct     Final Count by:    Sutures     Blood loss (ml)    Packing Intentionally Left In     Number     Comments           Information for the patient's :  Nael Gooden [[id number]]       Delivery  2018 3:59 PM by     Sex:  female Gestational Age: 40w2d  Delivery Clinician:     Living?:            APGARS  One minute Five minutes Ten minutes   Skin color:            Heart rate:            Grimace:            Muscle tone:            Breathing:            Totals: 8  9         Presentation/position:           Resuscitation and Interventions: Method:     Oxygen Type:     Intubation Time:   # of Attempts:     ETT Size:        Tracheal Suction:     Tracheal returns:      [place] Care at Delivery:           Cord information:     Disposition of cord blood:      Blood gases sent?    Complications:     Placenta: Delivered:           appearance.  Comments:  .  Disposition: Hospital disposal  [place] Measurements:  Weight:    Height:    Head circumference:    Chest circumference:     Temperature:     Other providers:       Additional  information:  Forceps:    Verbal Informed Consent Obtained:       Alternative Labor Strategies Discussed:     Emergency Resources Available:       Type:       Accrued Pulling Time:       # of Pulls:      Position:     Fetal Station:       Indications:      Other Indications:     Operative Vaginal Delivery Brief Note Forceps:        Vacuum:    Verbal Informed Consent Obtained:      Alternative Labor Strategies Discussed:     Emergency Resources Available:     Type:      Accrued Pulling Time:       # of Pop-Offs:       # of Pulls:       Position:     Fetal Station:      Indications for Vacuum:       Other Indications:    Operative Vaginal Delivery Brief Note Vacuum:        Shoulder Dystocia Shoulder Dystocia    Fetal Tracing Prior to Delivery:  Category 1   Shoulder dystocia present?:  No                                            Breech:       : Type:     Indications for Primary:     Indications for Secondary:     Other Indications:        Observed anomalies     Output in Delivery Room:

## 2018-11-02 NOTE — IP AVS SNAPSHOT
[facility]    [address]    [facility] [address]    Phone:  [phone]    Fax:  [phone]                                       After Visit Summary   11/2/2018    Mandy Gooden    MRN: [id number]           After Visit Summary Signature Page     I have received my discharge instructions, and my questions have been answered. I have discussed any challenges I see with this plan with the nurse or doctor.    ..........................................................................................................................................  Patient/Patient Representative Signature      ..........................................................................................................................................  Patient Representative Print Name and Relationship to Patient    ..................................................               ................................................  Date                                   Time    ..........................................................................................................................................  Reviewed by Signature/Title    ...................................................              ..............................................  Date                                               Time          22EPIC Rev 08/18

## 2018-11-02 NOTE — PROVIDER NOTIFICATION
11/02/18 1454   Provider Notification   Provider Name/Title Dr. Bean   Method of Notification In Department   Updated that patient is feeling much more uncomfortable, still yanely about every 4-5 minutes. Will continue to monitor.

## 2018-11-02 NOTE — PROVIDER NOTIFICATION
11/02/18 1515   Provider Notification   Provider Name/Title Dr. Bean   Method of Notification At Bedside   Came to check on patient. Will stay in department and come to attend delivery.

## 2018-11-03 ENCOUNTER — DOCUMENTATION ONLY (OUTPATIENT)
Dept: CARE COORDINATION | Facility: CLINIC | Age: 23
End: 2018-11-03

## 2018-11-03 VITALS
HEART RATE: 72 BPM | SYSTOLIC BLOOD PRESSURE: 116 MMHG | TEMPERATURE: 98.3 F | RESPIRATION RATE: 18 BRPM | DIASTOLIC BLOOD PRESSURE: 70 MMHG

## 2018-11-03 LAB — T PALLIDUM AB SER QL: NONREACTIVE

## 2018-11-03 PROCEDURE — 25000132 ZZH RX MED GY IP 250 OP 250 PS 637: Performed by: ADVANCED PRACTICE MIDWIFE

## 2018-11-03 PROCEDURE — 25000128 H RX IP 250 OP 636: Performed by: ADVANCED PRACTICE MIDWIFE

## 2018-11-03 RX ORDER — IBUPROFEN 800 MG/1
800 TABLET, FILM COATED ORAL EVERY 8 HOURS PRN
Qty: 90 TABLET | Refills: 1 | Status: SHIPPED | OUTPATIENT
Start: 2018-11-03

## 2018-11-03 RX ORDER — ASPIRIN 81 MG
100 TABLET, DELAYED RELEASE (ENTERIC COATED) ORAL DAILY
Qty: 60 TABLET | Refills: 1 | Status: SHIPPED | OUTPATIENT
Start: 2018-11-03

## 2018-11-03 RX ORDER — MEDROXYPROGESTERONE ACETATE 150 MG/ML
150 INJECTION, SUSPENSION INTRAMUSCULAR
Status: DISCONTINUED | OUTPATIENT
Start: 2018-11-03 | End: 2018-11-03 | Stop reason: HOSPADM

## 2018-11-03 RX ADMIN — IBUPROFEN 800 MG: 800 TABLET ORAL at 09:17

## 2018-11-03 RX ADMIN — SENNOSIDES AND DOCUSATE SODIUM 1 TABLET: 8.6; 5 TABLET ORAL at 09:18

## 2018-11-03 RX ADMIN — MEDROXYPROGESTERONE ACETATE 150 MG: 150 INJECTION, SUSPENSION, EXTENDED RELEASE INTRAMUSCULAR at 11:52

## 2018-11-03 ASSESSMENT — ACTIVITIES OF DAILY LIVING (ADL)
RETIRED_EATING: 0-->INDEPENDENT
SWALLOWING: 0-->SWALLOWS FOODS/LIQUIDS WITHOUT DIFFICULTY
TRANSFERRING: 0-->INDEPENDENT
AMBULATION: 0-->INDEPENDENT
PRIOR_FUNCTIONAL_LEVEL_COMMENT: INDEPENDENT
FALL_HISTORY_WITHIN_LAST_SIX_MONTHS: NO
BATHING: 0-->INDEPENDENT
TOILETING: 0-->INDEPENDENT
COGNITION: 0 - NO COGNITION ISSUES REPORTED
RETIRED_COMMUNICATION: 0-->UNDERSTANDS/COMMUNICATES WITHOUT DIFFICULTY
DRESS: 0-->INDEPENDENT

## 2018-11-03 NOTE — PLAN OF CARE
Problem: Patient Care Overview  Goal: Plan of Care/Patient Progress Review  Outcome: Improving  Meeting goals for shift, see flow sheet. Caring for self and infant in room with S/O and bonding well. Breast and bottle feeding, discussed to breast feed first and call for assist and to check latch. RX given to patient to fill on own per her request. Patient is comfortable, discharge later this PM.

## 2018-11-03 NOTE — LACTATION NOTE
This note was copied from a baby's chart.  Lactation to see patient.  Mom reports breastfeeding is going okay.  Mom plans to breast and bottle feed formula.  Encouraged to call for latching assistance when  is ready to feed.

## 2018-11-03 NOTE — PROGRESS NOTES
[facility] will be sharing updates with you on Maternal Child Health Referral requests for home care services.  This is for care coordination purposes and alert you to referral status.  We received the referral for  Mandy Gooden; MRN [id number] and want to update you:    Home visit for postpartum/  assessment and education offered to patient.  Patient declined need for home care visit.  Advised to follow up with Primary Care Providers for mom and baby.     Sincerely [facility]  Mahad Gambino  [phone]

## 2018-11-03 NOTE — DISCHARGE SUMMARY
Post Partum Note  SIGNIFICANT PROBLEMS:  Patient Active Problem List    Diagnosis Date Noted      (normal spontaneous vaginal delivery) 2018     Priority: Medium     Rupture of membranes with delay of delivery 10/26/2018     Priority: Medium     Indication for care in labor or delivery 10/13/2018     Priority: Medium     Profound impairment, one eye, impairment level not further specified 08/10/2018     Priority: Medium     Overview:   Created by Conversion       Vitamin D deficiency 08/10/2018     Priority: Medium     Overview:   Created by Conversion    Replacement Utility updated for latest IMO load       Normal pregnancy 2018     Priority: Medium     Late prenatal care 2018     Priority: Medium     Normal delivery 2017     Priority: Medium     Nasal congestion 2016     Priority: Medium       INTERVAL HISTORY:  /48  Pulse 72  Temp 97.7  F (36.5  C) (Oral)  Resp 18  LMP 2018  Breastfeeding? Unknown  Pt stable, baby is rooming in  Breast feeding status:breastfeeding with formula supplementation per patient request  Complications since 2 hours post delivery: None  Patient is tolerating acitivity well Voiding without difficulty, cramping is relieved by Ibuprophen, lochia is decreasing and patient denies clots.  Perineal pain is is relieved by Ibuprophen.  The perineum laceration is well approximated      Postpartum hemoglobin   Hemoglobin   Date Value Ref Range Status   2018 11.6 (L) 11.7 - 15.7 g/dL Final     Comment:     Results confirmed by repeat test     Blood type   Lab Results   Component Value Date    ABO O 2018       Lab Results   Component Value Date    RH Pos 2018     Rubella status No results found for: RUBELLAABIGG  History of depression: No    ASSESSMENT/PLAN:  Normal postpartum exam   Stable Post-partum day #1  Complications:none  Plan d/c home today  RTC 2 weeks  Teaching done: D/C Instructions: Nutrition/Activity, Birth Control  Options, Warning Signs/When to Call: Excessive Bleeding, Infection, PP Depression, RTC Clinic for PP Appointment and PNV  Birthcontrol planned:Depoprovera  Current Discharge Medication List      START taking these medications    Details   docusate sodium (COLACE) 100 MG tablet Take 100 mg by mouth daily  Qty: 60 tablet, Refills: 1    Associated Diagnoses: Normal delivery      ibuprofen (ADVIL/MOTRIN) 800 MG tablet Take 1 tablet (800 mg) by mouth every 8 hours as needed for moderate pain or other (cramping)  Qty: 90 tablet, Refills: 1    Associated Diagnoses: Normal delivery         CONTINUE these medications which have NOT CHANGED    Details   Prenatal Vit-Fe Fumarate-FA (PRENATAL MULTIVITAMIN PLUS IRON) 27-0.8 MG TABS per tablet Take 1 tablet by mouth daily  Qty: 100 tablet, Refills: 3    Associated Diagnoses: Pregnant on abdominal palpation in second trimester           Lani Guzman CNM

## 2018-11-03 NOTE — PLAN OF CARE
Problem: Patient Care Overview  Goal: Plan of Care/Patient Progress Review  Outcome: Improving  Pt up ad marixa.  VSS.  Pain well controlled with ibuprofen and tylenol.  Voiding without difficulty.  Frequent voiding encouraged.  Breastfeeding and formula feeding. Discussed patient's long term plan regarding feeding baby, breastfeeding or formula.  Patient unsure at this time. Education provided on the importance of exclusive breastfeeding and encouraged patient to put  to breast with each feed.  Encouraged patient to call as needed for breastfeeding assistance.  FOB present and supportive.  Continue to monitor.

## 2018-11-05 ENCOUNTER — MYC MEDICAL ADVICE (OUTPATIENT)
Dept: PEDIATRICS | Facility: CLINIC | Age: 23
End: 2018-11-05

## 2018-11-05 NOTE — TELEPHONE ENCOUNTER
I called and LM for pt to call and make an appointment for her .  I sent  [facility] as well.  Congratulated patient.    Bing Arguello, JOHN  Message handled by Nurse Triage.

## 2018-11-13 ENCOUNTER — MYC MEDICAL ADVICE (OUTPATIENT)
Dept: PEDIATRICS | Facility: CLINIC | Age: 23
End: 2018-11-13

## 2018-11-14 NOTE — TELEPHONE ENCOUNTER
Patient has questions about billing-advised to contact billing-the number in her statement-this is regarding her -was seen last week.      Bing Arguello RN  Message handled by Nurse Triage.

## 2019-08-23 NOTE — DISCHARGE INSTRUCTIONS
Postpartum Vaginal Delivery Instructions   Lactation [phone]    [facility] [phone]  Return to clinic 2&6 weeks     Activity       Ask family and friends for help when you need it.    Do not place anything in your vagina for 6 weeks.    You are not restricted on other activities, but take it easy for a few weeks to allow your body to recover from delivery.  You are able to do any activities you feel up to that point.    No driving until you have stopped taking your pain medications (usually two weeks after delivery).     Call your health care provider if you have any of these symptoms:       Increased pain, swelling, redness, or fluid around your stiches from an episiotomy or perineal tear.    A fever above 100.4 F (38 C) with or without chills when placing a thermometer under your tongue.    You soak a sanitary pad with blood within 1 hour, or you see blood clots larger than a golf ball.    Bleeding that lasts more than 6 weeks.    Vaginal discharge that smells bad.    Severe pain, cramping or tenderness in your lower belly area.    A need to urinate more frequently (use the toilet more often), more urgently (use the toilet very quickly), or it burns when you urinate.    Nausea and vomiting.    Redness, swelling or pain around a vein in your leg.    Problems breastfeeding or a red or painful area on your breast.    Chest pain and cough or are gasping for air.    Problems coping with sadness, anxiety, or depression.  If you have any concerns about hurting yourself or the baby, call your provider immediately.     You have questions or concerns after you return home.     Keep your hands clean:  Always wash your hands before touching your perineal area and stitches.  This helps reduce your risk of infection.  If your hands aren't dirty, you may use an alcohol hand-rub to clean your hands. Keep your nails clean and short.       patient

## 2019-11-06 ENCOUNTER — HEALTH MAINTENANCE LETTER (OUTPATIENT)
Age: 24
End: 2019-11-06

## 2020-11-29 ENCOUNTER — HEALTH MAINTENANCE LETTER (OUTPATIENT)
Age: 25
End: 2020-11-29

## 2021-05-30 VITALS — BODY MASS INDEX: 30.04 KG/M2 | HEIGHT: 60 IN | WEIGHT: 153 LBS

## 2021-05-31 VITALS — WEIGHT: 169 LBS | BODY MASS INDEX: 32.73 KG/M2

## 2021-05-31 VITALS — WEIGHT: 150 LBS | HEIGHT: 60 IN | BODY MASS INDEX: 29.45 KG/M2

## 2021-05-31 VITALS — BODY MASS INDEX: 28.76 KG/M2 | WEIGHT: 148.5 LBS

## 2021-05-31 VITALS — WEIGHT: 168 LBS | BODY MASS INDEX: 32.54 KG/M2

## 2021-05-31 VITALS — HEIGHT: 60 IN | WEIGHT: 149.04 LBS | BODY MASS INDEX: 29.26 KG/M2

## 2021-06-10 NOTE — PROGRESS NOTES
First OB.   Feeling well. Some nausea. No bleeding.   Working and in school. Same with her boyfriend.   STD screening collected.

## 2021-06-10 NOTE — PROGRESS NOTES
Chief Complaint:  Chief Complaint   Patient presents with     Initial Prenatal Visit     HPI:   Mandy Gooden is a 21 y.o. female is here for pregnancy intake.  She is .  Patient's last menstrual period was 2017 (lmp unknown).  Doing well, no concerns.  In a relationship with baby's father.  Currently living with her parents.    Past medical history: reviewed and updated.  No past medical history on file.  No past surgical history on file.    Current Outpatient Prescriptions:      prenatal vit-iron fumarate-FA (PRENATAL VITAMIN) 27-0.8 mg Tab, Take 1 tablet by mouth once daily., Disp: 100 tablet, Rfl: 3    Social:  Social History   Substance Use Topics     Smoking status: Never Smoker     Smokeless tobacco: None     Alcohol use No       OBJECTIVE:  No Known Allergies  Vitals:    17 0841   BP: 100/62   Pulse: 68     Body mass index is 29.63 kg/(m^2).    Vital signs stable as recorded above  General: Patient is alert and oriented x 3, in no apparent distress  Physical Exam deferred to patient's First OB Visit.    Results:  Results for orders placed or performed in visit on 17   Pregnancy, Urine   Result Value Ref Range    Pregnancy Test, Urine Positive (!) Negative   Urinalysis, OB Screen   Result Value Ref Range    Glucose, UA Negative Negative    Ketones, UA Negative Negative    Protein, UA Negative Negative mg/dL   Hepatitis B Surface antigen (HBsAG)   Result Value Ref Range    Hepatitis B Surface Ag Negative Negative   HIV Antigen/Antibody Screening Cascade   Result Value Ref Range    HIV Antigen / Antibody Negative Negative   Drugs of Abuse 1,Urine   Result Value Ref Range    Amphetamines Screen Negative Screen Negative    Benzodiazepines Screen Negative Screen Negative    Opiates Screen Negative Screen Negative    Phencyclidine Screen Negative Screen Negative    THC Screen Negative Screen Negative    Barbiturates Screen Negative Screen Negative    Cocaine Metabolite Screen Negative Screen  Negative    Oxycodone Screen Negative Screen Negative    Creatinine, Urine 109.6 mg/dL   HM1 (CBC with Diff)   Result Value Ref Range    WBC 8.0 4.0 - 11.0 thou/uL    RBC 4.28 3.80 - 5.40 mill/uL    Hemoglobin 12.0 12.0 - 16.0 g/dL    Hematocrit 36.3 35.0 - 47.0 %    MCV 85 80 - 100 fL    MCH 28.0 27.0 - 34.0 pg    MCHC 33.1 32.0 - 36.0 g/dL    RDW 12.2 11.0 - 14.5 %    Platelets 277 140 - 440 thou/uL    MPV 10.1 8.5 - 12.5 fL    Neutrophils % 64 50 - 70 %    Lymphocytes % 29 20 - 40 %    Monocytes % 6 2 - 10 %    Eosinophils % 1 0 - 6 %    Basophils % 1 0 - 2 %    Neutrophils Absolute 5.1 2.0 - 7.7 thou/uL    Lymphocytes Absolute 2.3 0.8 - 4.4 thou/uL    Monocytes Absolute 0.5 0.0 - 0.9 thou/uL    Eosinophils Absolute 0.1 0.0 - 0.4 thou/uL    Basophils Absolute 0.0 0.0 - 0.2 thou/uL     Other screening pregnancy lab work is ordered and pending.    Patient scored a 3/30 on [place]  Depression Screen.    Assessment and Plan:  1. Pregnancy Intake Appointment.  Mandy Gooden is 21 y.o. and .  Patient's last menstrual period was 2017 (lmp unknown).  Estimated Date of Delivery: 17  She will be seeing Dr. Bell for OB care.  Seeing Dr. Andres until September.  Screening pregnancy lab work was drawn.  Prenatal vitamins prescribed.  Problem list and current medications reviewed and updated.  Dating ultrasound ordered.  Prenatal info packet given.    Follow up in 4 weeks.  Please see OB Episode for complete details.  Visit was approximately 35 minutes, greater than 50% of time spent in face-to-face counseling and coordination of care.    This dictation uses voice recognition software, which may contain typographical errors.

## 2021-06-12 NOTE — PROGRESS NOTES
Assessment/plan  1. Supervision of normal pregnancy    2. Short cervical length during pregnancy  - Ambulatory referral to Obstetrics / Gynecology    We discussed her recent ob ultrasound results.   We discussed urgent need to check on cervical length.   We discussed that if cervical length is abnormal on repeat imaging, she would be seeking evaluation and prompt treatment with OB gyn.   She verbalized understanding.   Will be seen today.   Will follow recommendations.         Subjective  [age] year old  at 22 weeks 1 day by early ultrasound asked to follow up. Fetal survey showed abnormal cervical length.   By radiology reading at [facility], it was 1 cm. I was not called about his result. Unsure if this is the correct measurement or not.   Patient was asked to be seen.   She denies any pelvic pain or pressure. No bleeding. Has normal urine. No dysuria. Is tolerating diet.   Is currently not in school or working.   Nonsmoker.   We discussed these results and need for follow up imaging either with [facility] or [facility].       No past medical history on file.  Patient Active Problem List   Diagnosis     Profound Visual Impairment In The Right Eye     Vitamin D Deficiency     Nasal congestion     Normal pregnancy     No past surgical history on file.  Family History   Problem Relation Age of Onset     No Medical Problems Mother      No Medical Problems Father      No Medical Problems Sister      No Medical Problems Brother      No Medical Problems Sister      No Medical Problems Sister      No Medical Problems Sister      Social History     Social History     Marital status: Single     Spouse name: N/A     Number of children: N/A     Years of education: N/A     Occupational History     Not on file.     Social History Main Topics     Smoking status: Never Smoker     Smokeless tobacco: Not on file     Alcohol use No     Drug use: No     Sexual activity: Yes     Partners: Male     Other Topics Concern      Not on file     Social History Narrative     Current Outpatient Prescriptions on File Prior to Visit   Medication Sig Dispense Refill     prenatal vit-iron fumarate-FA (PRENATAL VITAMIN) 27-0.8 mg Tab Take 1 tablet by mouth once daily. 100 tablet 3     No current facility-administered medications on file prior to visit.      Objective  Vitals:    08/03/17 1316   BP: 100/54   Pulse: 66   Resp: 19     Weight: 150 lb (68 kg)     General: appears comfortable.   Abdomen: gravid, non tender, fundal height 22 cm.   FHT: 145's  Skin: warm, dry  Extremities: no edema

## 2021-06-12 NOTE — PROGRESS NOTES
No ctx, lof, bleeding. Not really feeling any FM.   Fetal survey scheduled today.   No other concerns or questions.   Discussed usual 4 week follow up.

## 2021-06-14 NOTE — PROGRESS NOTES
Patient was a no show for SW appointment. She is on MA effective 7/1/2017 that should cover her pregnancy and the child up until the child is one month of age. Hospital SW can enroll child in MA.

## 2021-09-19 ENCOUNTER — HEALTH MAINTENANCE LETTER (OUTPATIENT)
Age: 26
End: 2021-09-19

## 2022-01-09 ENCOUNTER — HEALTH MAINTENANCE LETTER (OUTPATIENT)
Age: 27
End: 2022-01-09

## 2022-11-21 ENCOUNTER — HEALTH MAINTENANCE LETTER (OUTPATIENT)
Age: 27
End: 2022-11-21

## 2023-04-16 ENCOUNTER — HEALTH MAINTENANCE LETTER (OUTPATIENT)
Age: 28
End: 2023-04-16

## 2023-05-25 NOTE — PROGRESS NOTES
This patient lost to follow up since August, she says her insurance was not active.   She denies ctx, lof, bleeding. +Fm.   Was seen by [facility] in August due to short cervix on fetal survey. It was later normal.   We discussed missed prenatal care.   One hour today.   Influenza and Tdap today.   GBS today.   Has been schedule with social work to assist.   
minimum assist (75% patients effort)